# Patient Record
Sex: FEMALE | Race: WHITE | NOT HISPANIC OR LATINO | Employment: PART TIME | URBAN - METROPOLITAN AREA
[De-identification: names, ages, dates, MRNs, and addresses within clinical notes are randomized per-mention and may not be internally consistent; named-entity substitution may affect disease eponyms.]

---

## 2018-04-27 ENCOUNTER — OFFICE VISIT (OUTPATIENT)
Dept: INTERNAL MEDICINE | Facility: CLINIC | Age: 48
End: 2018-04-27

## 2018-04-27 ENCOUNTER — HOSPITAL ENCOUNTER (OUTPATIENT)
Dept: GENERAL RADIOLOGY | Facility: HOSPITAL | Age: 48
Discharge: HOME | End: 2018-04-27
Admitting: PHYSICIAN ASSISTANT

## 2018-04-27 VITALS
RESPIRATION RATE: 22 BRPM | SYSTOLIC BLOOD PRESSURE: 88 MMHG | HEART RATE: 100 BPM | DIASTOLIC BLOOD PRESSURE: 60 MMHG | TEMPERATURE: 97.6 F | WEIGHT: 124.2 LBS

## 2018-04-27 DIAGNOSIS — M51.36 DEGENERATIVE DISC DISEASE, LUMBAR: ICD-10-CM

## 2018-04-27 DIAGNOSIS — M54.6 THORACIC SPINE PAIN: ICD-10-CM

## 2018-04-27 DIAGNOSIS — M54.42 ACUTE LEFT-SIDED LOW BACK PAIN WITH LEFT-SIDED SCIATICA: ICD-10-CM

## 2018-04-27 DIAGNOSIS — M54.42 ACUTE LEFT-SIDED LOW BACK PAIN WITH LEFT-SIDED SCIATICA: Primary | ICD-10-CM

## 2018-04-27 DIAGNOSIS — R53.1 WEAKNESS: ICD-10-CM

## 2018-04-27 LAB
ANION GAP SERPL CALCULATED.3IONS-SCNC: 14 MMOL/L (ref 3–11)
BASOPHILS # BLD AUTO: 0.02 10*3/MM3 (ref 0–0.2)
BASOPHILS NFR BLD AUTO: 0.3 % (ref 0–1)
BUN BLD-MCNC: 19 MG/DL (ref 9–23)
BUN/CREAT SERPL: 23.8 (ref 7–25)
CALCIUM SPEC-SCNC: 9.4 MG/DL (ref 8.7–10.4)
CHLORIDE SERPL-SCNC: 104 MMOL/L (ref 99–109)
CO2 SERPL-SCNC: 20 MMOL/L (ref 20–31)
CREAT BLD-MCNC: 0.8 MG/DL (ref 0.6–1.3)
DEPRECATED RDW RBC AUTO: 58.9 FL (ref 37–54)
EOSINOPHIL # BLD AUTO: 0.18 10*3/MM3 (ref 0–0.3)
EOSINOPHIL NFR BLD AUTO: 2.5 % (ref 0–3)
ERYTHROCYTE [DISTWIDTH] IN BLOOD BY AUTOMATED COUNT: 18.3 % (ref 11.3–14.5)
GFR SERPL CREATININE-BSD FRML MDRD: 76 ML/MIN/1.73
GLUCOSE BLD-MCNC: 88 MG/DL (ref 70–100)
HCT VFR BLD AUTO: 37.6 % (ref 34.5–44)
HGB BLD-MCNC: 11.5 G/DL (ref 11.5–15.5)
IMM GRANULOCYTES # BLD: 0.02 10*3/MM3 (ref 0–0.03)
IMM GRANULOCYTES NFR BLD: 0.3 % (ref 0–0.6)
LYMPHOCYTES # BLD AUTO: 1.94 10*3/MM3 (ref 0.6–4.8)
LYMPHOCYTES NFR BLD AUTO: 27.4 % (ref 24–44)
MCH RBC QN AUTO: 27 PG (ref 27–31)
MCHC RBC AUTO-ENTMCNC: 30.6 G/DL (ref 32–36)
MCV RBC AUTO: 88.3 FL (ref 80–99)
MONOCYTES # BLD AUTO: 0.42 10*3/MM3 (ref 0–1)
MONOCYTES NFR BLD AUTO: 5.9 % (ref 0–12)
NEUTROPHILS # BLD AUTO: 4.5 10*3/MM3 (ref 1.5–8.3)
NEUTROPHILS NFR BLD AUTO: 63.6 % (ref 41–71)
PLATELET # BLD AUTO: 328 10*3/MM3 (ref 150–450)
PMV BLD AUTO: 10.5 FL (ref 6–12)
POTASSIUM BLD-SCNC: 4.4 MMOL/L (ref 3.5–5.5)
RBC # BLD AUTO: 4.26 10*6/MM3 (ref 3.89–5.14)
SODIUM BLD-SCNC: 138 MMOL/L (ref 132–146)
WBC NRBC COR # BLD: 7.08 10*3/MM3 (ref 3.5–10.8)

## 2018-04-27 PROCEDURE — 99204 OFFICE O/P NEW MOD 45 MIN: CPT | Performed by: PHYSICIAN ASSISTANT

## 2018-04-27 PROCEDURE — 72070 X-RAY EXAM THORAC SPINE 2VWS: CPT

## 2018-04-27 PROCEDURE — 72100 X-RAY EXAM L-S SPINE 2/3 VWS: CPT

## 2018-04-27 PROCEDURE — 85025 COMPLETE CBC W/AUTO DIFF WBC: CPT | Performed by: PHYSICIAN ASSISTANT

## 2018-04-27 PROCEDURE — 80048 BASIC METABOLIC PNL TOTAL CA: CPT | Performed by: PHYSICIAN ASSISTANT

## 2018-04-27 RX ORDER — GABAPENTIN 300 MG/1
300 CAPSULE ORAL 2 TIMES DAILY
Qty: 60 CAPSULE | Refills: 1 | Status: SHIPPED | OUTPATIENT
Start: 2018-04-27 | End: 2018-05-16

## 2018-04-27 RX ORDER — APIXABAN 5 MG/1
1 TABLET, FILM COATED ORAL 2 TIMES DAILY
Refills: 5 | COMMUNITY
Start: 2018-02-02 | End: 2018-09-28 | Stop reason: SDUPTHER
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
## 2018-04-30 DIAGNOSIS — M54.42 ACUTE LOW BACK PAIN WITH LEFT-SIDED SCIATICA, UNSPECIFIED BACK PAIN LATERALITY: Primary | ICD-10-CM

## 2018-04-30 DIAGNOSIS — M54.6 CHRONIC BILATERAL THORACIC BACK PAIN: ICD-10-CM

## 2018-04-30 DIAGNOSIS — G89.29 CHRONIC BILATERAL THORACIC BACK PAIN: ICD-10-CM

## 2018-05-01 ENCOUNTER — TELEPHONE (OUTPATIENT)
Dept: INTERNAL MEDICINE | Facility: CLINIC | Age: 48
End: 2018-05-01

## 2018-05-01 DIAGNOSIS — M62.830 MUSCLE SPASM OF BACK: Primary | ICD-10-CM

## 2018-05-01 RX ORDER — TIZANIDINE HYDROCHLORIDE 4 MG/1
4 CAPSULE, GELATIN COATED ORAL 3 TIMES DAILY
Qty: 90 CAPSULE | Refills: 0 | Status: SHIPPED | OUTPATIENT
Start: 2018-05-01 | End: 2018-08-16

## 2018-05-01 NOTE — TELEPHONE ENCOUNTER
Re: Zanaflex and physical therapy     That's fine, I will write for Zanaflex instead. She can d/c as necessary if it worsens her back spasms. Please advise that this may decrease her BP and BP is already low on regular basis, so may cause dizziness/ lightheadedness. Caution to rise slowly when going from sitting to standing until she knows how this med will affect her. Sent [facility] [place].     Regarding physical therapy referral, I am happy to put this in once she is cleared by neurosurgery. I am concerned that PT may worsen her symptoms at this time and would like to defer PT referral until she sees neurosurgery. I think this is a better long term plan so that she can discuss treatment options- surgical vs non-surgical options- with neurosurg first. Will order PT after neurosurgery appt as necessary.

## 2018-05-01 NOTE — TELEPHONE ENCOUNTER
Pt is willing to try Robaxin. She would rather have zanaflex if possible. [facility] [place]. She stated in the past it hasn't worked very well but the spasms are so bad she is willing to try again. She mentioned a referral to ortho as well? she mentioned that while at the hospital at [facility] they told her to have PCP put in a referral to Ortho and/or Physical Therapy. Can you advise regarding that?

## 2018-05-01 NOTE — TELEPHONE ENCOUNTER
Pt informed of Provider comment below. Read her word for word. Pt verbalized good understanding, stated she will call our office if she had any further issues, thanked our office and we ended the call.
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
## 2018-05-01 NOTE — TELEPHONE ENCOUNTER
Please call patient and let her know that we have put in referral for neurosurgery and they should be in touch soon, but may still take a few days for them to reach out. I can call in Robaxin as muscle relaxer for improved pain control and decrease muscle spasms, but please ask patient before I send this in as she has had poor reaction to other muscle relaxers (flexeril) in the past.  Continue Ibuprofen and Gabapentin as discussed during office visit.     ----- Message from Crisat Frye sent at 5/1/2018  9:56 AM EDT -----  Contact: SELF  OSITO BOWLING CALLING WITH PAIN IN HER BACK, SHE SAID SHE SPOKE WITH YOU YESTERDAY AND DISCUSSED PAIN MANAGEMENT AND NEUROLOGY BUT SAYS SHE HAS NOT HEARD ANYTHING AND THE NEURONTIN IS NOT HELPING AT ALL. SHE WANTS TO KNOW WHAT ELSE YOU WOULD SUGGEST UNTIL SHE CAN GET INTO PAIN MANAGEMENT. SHE CAN BE REACHED [phone]
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
## 2018-05-04 ENCOUNTER — TELEPHONE (OUTPATIENT)
Dept: INTERNAL MEDICINE | Facility: CLINIC | Age: 48
End: 2018-05-04

## 2018-05-04 NOTE — TELEPHONE ENCOUNTER
Please print work excuse for patient from 5/4/2018--5/11/2018. I will sign. Leave at front office for patient .    Please call patient and let her know we will provide work excuse from 5/4/2018-- 5/11/2018. She or Melanie can come pick this up today at the front office.           ----- Message from Sonia Landon V sent at 5/4/2018  9:24 AM EDT -----  PT STILL CAN'T WALK AND IS IN A LOT OF PAIN. ASKING IF WE CAN EXTEND HER EXCUSE FOR WORK    SHE CAN BE REACHED [phone]

## 2018-05-04 NOTE — TELEPHONE ENCOUNTER
Letter printed, awaiting signature. Called pt to inform, she verbalized understanding and stated her friend, Melanie Gallardo, will be the one to  the letter. I advised her that was fine to just make sure that Melanie had her photo ID to show the front staff. She verbalized good understanding, thanked our office and we ended the call.

## 2018-05-11 ENCOUNTER — TELEPHONE (OUTPATIENT)
Dept: INTERNAL MEDICINE | Facility: CLINIC | Age: 48
End: 2018-05-11

## 2018-05-11 ENCOUNTER — HOSPITAL ENCOUNTER (EMERGENCY)
Facility: HOSPITAL | Age: 48
Discharge: LEFT BEFORE DISCHARGE | End: 2018-05-11
Attending: EMERGENCY MEDICINE | Admitting: EMERGENCY MEDICINE

## 2018-05-11 VITALS
BODY MASS INDEX: 19.13 KG/M2 | HEIGHT: 66 IN | HEART RATE: 69 BPM | DIASTOLIC BLOOD PRESSURE: 74 MMHG | OXYGEN SATURATION: 100 % | TEMPERATURE: 97.8 F | RESPIRATION RATE: 16 BRPM | SYSTOLIC BLOOD PRESSURE: 97 MMHG | WEIGHT: 119 LBS

## 2018-05-11 DIAGNOSIS — R41.82 ALTERED MENTAL STATUS, UNSPECIFIED ALTERED MENTAL STATUS TYPE: ICD-10-CM

## 2018-05-11 DIAGNOSIS — R41.0 CONFUSION: ICD-10-CM

## 2018-05-11 DIAGNOSIS — N30.00 ACUTE CYSTITIS WITHOUT HEMATURIA: ICD-10-CM

## 2018-05-11 DIAGNOSIS — F19.10 POLYSUBSTANCE ABUSE (HCC): Primary | ICD-10-CM

## 2018-05-11 LAB
ALBUMIN SERPL-MCNC: 4.7 G/DL (ref 3.2–4.8)
ALBUMIN/GLOB SERPL: 1.5 G/DL (ref 1.5–2.5)
ALP SERPL-CCNC: 151 U/L (ref 25–100)
ALT SERPL W P-5'-P-CCNC: 43 U/L (ref 7–40)
AMPHET+METHAMPHET UR QL: POSITIVE
AMPHETAMINES UR QL: POSITIVE
ANION GAP SERPL CALCULATED.3IONS-SCNC: 13 MMOL/L (ref 3–11)
AST SERPL-CCNC: 26 U/L (ref 0–33)
BACTERIA UR QL AUTO: ABNORMAL /HPF
BARBITURATES UR QL SCN: NEGATIVE
BASOPHILS # BLD AUTO: 0.03 10*3/MM3 (ref 0–0.2)
BASOPHILS NFR BLD AUTO: 0.4 % (ref 0–1)
BENZODIAZ UR QL SCN: POSITIVE
BILIRUB SERPL-MCNC: 0.3 MG/DL (ref 0.3–1.2)
BILIRUB UR QL STRIP: NEGATIVE
BUN BLD-MCNC: 16 MG/DL (ref 9–23)
BUN/CREAT SERPL: 17.8 (ref 7–25)
BUPRENORPHINE SERPL-MCNC: POSITIVE NG/ML
CALCIUM SPEC-SCNC: 9.7 MG/DL (ref 8.7–10.4)
CANNABINOIDS SERPL QL: POSITIVE
CHLORIDE SERPL-SCNC: 105 MMOL/L (ref 99–109)
CLARITY UR: ABNORMAL
CO2 SERPL-SCNC: 25 MMOL/L (ref 20–31)
COCAINE UR QL: POSITIVE
COLOR UR: ABNORMAL
CREAT BLD-MCNC: 0.9 MG/DL (ref 0.6–1.3)
DEPRECATED RDW RBC AUTO: 58.9 FL (ref 37–54)
EOSINOPHIL # BLD AUTO: 0.05 10*3/MM3 (ref 0–0.3)
EOSINOPHIL NFR BLD AUTO: 0.6 % (ref 0–3)
ERYTHROCYTE [DISTWIDTH] IN BLOOD BY AUTOMATED COUNT: 18.4 % (ref 11.3–14.5)
GFR SERPL CREATININE-BSD FRML MDRD: 67 ML/MIN/1.73
GLOBULIN UR ELPH-MCNC: 3.2 GM/DL
GLUCOSE BLD-MCNC: 122 MG/DL (ref 70–100)
GLUCOSE UR STRIP-MCNC: NEGATIVE MG/DL
HCT VFR BLD AUTO: 33.6 % (ref 34.5–44)
HGB BLD-MCNC: 10.5 G/DL (ref 11.5–15.5)
HGB UR QL STRIP.AUTO: ABNORMAL
HOLD SPECIMEN: NORMAL
HOLD SPECIMEN: NORMAL
HYALINE CASTS UR QL AUTO: ABNORMAL /LPF
IMM GRANULOCYTES # BLD: 0.02 10*3/MM3 (ref 0–0.03)
IMM GRANULOCYTES NFR BLD: 0.2 % (ref 0–0.6)
KETONES UR QL STRIP: ABNORMAL
LEUKOCYTE ESTERASE UR QL STRIP.AUTO: NEGATIVE
LYMPHOCYTES # BLD AUTO: 2.31 10*3/MM3 (ref 0.6–4.8)
LYMPHOCYTES NFR BLD AUTO: 28.1 % (ref 24–44)
MCH RBC QN AUTO: 27.5 PG (ref 27–31)
MCHC RBC AUTO-ENTMCNC: 31.3 G/DL (ref 32–36)
MCV RBC AUTO: 88 FL (ref 80–99)
METHADONE UR QL SCN: NEGATIVE
MONOCYTES # BLD AUTO: 0.68 10*3/MM3 (ref 0–1)
MONOCYTES NFR BLD AUTO: 8.3 % (ref 0–12)
MUCOUS THREADS URNS QL MICRO: ABNORMAL /HPF
NEUTROPHILS # BLD AUTO: 5.14 10*3/MM3 (ref 1.5–8.3)
NEUTROPHILS NFR BLD AUTO: 62.6 % (ref 41–71)
NITRITE UR QL STRIP: NEGATIVE
OPIATES UR QL: NEGATIVE
OXYCODONE UR QL SCN: NEGATIVE
PCP UR QL SCN: NEGATIVE
PH UR STRIP.AUTO: <=5 [PH] (ref 5–8)
PLATELET # BLD AUTO: 390 10*3/MM3 (ref 150–450)
PMV BLD AUTO: 10.9 FL (ref 6–12)
POTASSIUM BLD-SCNC: 3.3 MMOL/L (ref 3.5–5.5)
PROPOXYPH UR QL: NEGATIVE
PROT SERPL-MCNC: 7.9 G/DL (ref 5.7–8.2)
PROT UR QL STRIP: ABNORMAL
RBC # BLD AUTO: 3.82 10*6/MM3 (ref 3.89–5.14)
RBC # UR: ABNORMAL /HPF
REF LAB TEST METHOD: ABNORMAL
SODIUM BLD-SCNC: 143 MMOL/L (ref 132–146)
SP GR UR STRIP: 1.03 (ref 1–1.03)
SQUAMOUS #/AREA URNS HPF: ABNORMAL /HPF
TRICYCLICS UR QL SCN: NEGATIVE
UROBILINOGEN UR QL STRIP: ABNORMAL
WBC NRBC COR # BLD: 8.21 10*3/MM3 (ref 3.5–10.8)
WBC UR QL AUTO: ABNORMAL /HPF
WHOLE BLOOD HOLD SPECIMEN: NORMAL
WHOLE BLOOD HOLD SPECIMEN: NORMAL

## 2018-05-11 PROCEDURE — 80306 DRUG TEST PRSMV INSTRMNT: CPT

## 2018-05-11 PROCEDURE — 81001 URINALYSIS AUTO W/SCOPE: CPT

## 2018-05-11 PROCEDURE — 80053 COMPREHEN METABOLIC PANEL: CPT | Performed by: EMERGENCY MEDICINE

## 2018-05-11 PROCEDURE — 99283 EMERGENCY DEPT VISIT LOW MDM: CPT

## 2018-05-11 PROCEDURE — 85025 COMPLETE CBC W/AUTO DIFF WBC: CPT | Performed by: EMERGENCY MEDICINE

## 2018-05-11 RX ORDER — SODIUM CHLORIDE 0.9 % (FLUSH) 0.9 %
10 SYRINGE (ML) INJECTION AS NEEDED
Status: DISCONTINUED | OUTPATIENT
Start: 2018-05-11 | End: 2018-05-12 | Stop reason: HOSPADM

## 2018-05-11 RX ORDER — NITROFURANTOIN 25; 75 MG/1; MG/1
100 CAPSULE ORAL 2 TIMES DAILY
Qty: 10 CAPSULE | Refills: 0 | Status: SHIPPED | OUTPATIENT
Start: 2018-05-11 | End: 2018-05-16

## 2018-05-11 NOTE — TELEPHONE ENCOUNTER
Noted.     ----- Message from Sonia Landon V sent at 5/11/2018  8:15 AM EDT -----  PTS PARTNER, HIRAM CIRA, CALLED STATING THAT PATIENT IS LOSING HER MEMORY. HER LONG TERM IS FINE BUT HER SHORT TERM IS COMPLETELY GONE.     SHE DOESN'T KNOW WHAT YEAR It, WHO THE PRESIDENT Is, SHE THINKS SHE IS STILL WORKING, FORGETTING WHERE SHE LIVES.    SPOKE WITH SHALINI AND SHE ADVISED PT GO TO Er.    PASSED MESSAGE ALONG AND PARTNER-HIRAM IS TAKING HER TO [facility] ER    SHE CAN BE REACHED [phone]

## 2018-05-12 NOTE — DISCHARGE INSTRUCTIONS
Follow up with Dr. Man at the next available time. Call and make an appointment.    Keep your scheduled appointment with Dr. Davenport.    Follow up with Dr. Benavides at the next available time.    Immediately return to the emergency department for any new or worsening symptoms.     Please review the medications you are supposed to be taking according to prior physician recommendations. I have not changed your home medications during this visit. If your discharge instructions indicate that I have changed your home medications, this is not the case, and you should disregard. If you have any questions about the medication you should be taking at home, please call your physician.
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
## 2018-05-16 ENCOUNTER — OFFICE VISIT (OUTPATIENT)
Dept: NEUROSURGERY | Facility: CLINIC | Age: 48
End: 2018-05-16

## 2018-05-16 VITALS
DIASTOLIC BLOOD PRESSURE: 64 MMHG | TEMPERATURE: 98.1 F | HEIGHT: 67 IN | WEIGHT: 126 LBS | SYSTOLIC BLOOD PRESSURE: 112 MMHG | BODY MASS INDEX: 19.78 KG/M2

## 2018-05-16 DIAGNOSIS — F19.10 POLYSUBSTANCE ABUSE (HCC): ICD-10-CM

## 2018-05-16 DIAGNOSIS — Z71.6 ENCOUNTER FOR SMOKING CESSATION COUNSELING: ICD-10-CM

## 2018-05-16 DIAGNOSIS — M54.42 ACUTE LEFT-SIDED LOW BACK PAIN WITH LEFT-SIDED SCIATICA: ICD-10-CM

## 2018-05-16 DIAGNOSIS — M54.9 MECHANICAL BACK PAIN: ICD-10-CM

## 2018-05-16 DIAGNOSIS — M51.36 DISC DEGENERATION, LUMBAR: Primary | ICD-10-CM

## 2018-05-16 PROCEDURE — 99213 OFFICE O/P EST LOW 20 MIN: CPT | Performed by: PHYSICIAN ASSISTANT

## 2018-05-16 PROCEDURE — 99406 BEHAV CHNG SMOKING 3-10 MIN: CPT | Performed by: PHYSICIAN ASSISTANT

## 2018-05-16 RX ORDER — ARIPIPRAZOLE 10 MG/1
TABLET ORAL
COMMUNITY
Start: 2018-04-30 | End: 2018-05-30

## 2018-05-16 RX ORDER — CLINDAMYCIN HYDROCHLORIDE 300 MG/1
CAPSULE ORAL
COMMUNITY
Start: 2018-03-23 | End: 2018-08-16

## 2018-05-16 RX ORDER — LORATADINE, PSEUDOEPHEDRINE SULFATE 5; 120 MG/1; MG/1
TABLET, FILM COATED, EXTENDED RELEASE ORAL
COMMUNITY
Start: 2018-03-13 | End: 2018-08-16

## 2018-05-16 RX ORDER — ACYCLOVIR 50 MG/G
OINTMENT TOPICAL
COMMUNITY
Start: 2018-03-23 | End: 2018-05-30

## 2018-05-16 RX ORDER — BENZONATATE 200 MG/1
CAPSULE ORAL
COMMUNITY
Start: 2018-03-13 | End: 2018-08-16

## 2018-05-16 RX ORDER — TIZANIDINE 4 MG/1
TABLET ORAL
COMMUNITY
Start: 2018-05-01 | End: 2018-08-16
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
## 2018-05-23 PROBLEM — F19.10 POLYSUBSTANCE ABUSE (HCC): Status: ACTIVE | Noted: 2018-05-23

## 2018-05-30 ENCOUNTER — OFFICE VISIT (OUTPATIENT)
Dept: NEUROLOGY | Facility: CLINIC | Age: 48
End: 2018-05-30

## 2018-05-30 ENCOUNTER — APPOINTMENT (OUTPATIENT)
Dept: LAB | Facility: HOSPITAL | Age: 48
End: 2018-05-30

## 2018-05-30 VITALS
WEIGHT: 128 LBS | HEART RATE: 68 BPM | HEIGHT: 67 IN | OXYGEN SATURATION: 98 % | BODY MASS INDEX: 20.09 KG/M2 | DIASTOLIC BLOOD PRESSURE: 50 MMHG | SYSTOLIC BLOOD PRESSURE: 84 MMHG

## 2018-05-30 DIAGNOSIS — F19.10 POLYSUBSTANCE ABUSE (HCC): ICD-10-CM

## 2018-05-30 DIAGNOSIS — R41.3 MEMORY LOSS: Primary | ICD-10-CM

## 2018-05-30 LAB
25(OH)D3 SERPL-MCNC: 24.8 NG/ML
ALBUMIN SERPL-MCNC: 4.7 G/DL (ref 3.2–4.8)
ALBUMIN/GLOB SERPL: 1.6 G/DL (ref 1.5–2.5)
ALP SERPL-CCNC: 115 U/L (ref 25–100)
ALT SERPL W P-5'-P-CCNC: 48 U/L (ref 7–40)
AMPHET+METHAMPHET UR QL: NEGATIVE
AMPHETAMINES UR QL: NEGATIVE
ANION GAP SERPL CALCULATED.3IONS-SCNC: 8 MMOL/L (ref 3–11)
AST SERPL-CCNC: 42 U/L (ref 0–33)
BARBITURATES UR QL SCN: NEGATIVE
BENZODIAZ UR QL SCN: POSITIVE
BILIRUB SERPL-MCNC: 0.2 MG/DL (ref 0.3–1.2)
BUN BLD-MCNC: 10 MG/DL (ref 9–23)
BUN/CREAT SERPL: 14.3 (ref 7–25)
BUPRENORPHINE SERPL-MCNC: NEGATIVE NG/ML
CALCIUM SPEC-SCNC: 9.7 MG/DL (ref 8.7–10.4)
CANNABINOIDS SERPL QL: NEGATIVE
CHLORIDE SERPL-SCNC: 105 MMOL/L (ref 99–109)
CO2 SERPL-SCNC: 28 MMOL/L (ref 20–31)
COCAINE UR QL: NEGATIVE
CREAT BLD-MCNC: 0.7 MG/DL (ref 0.6–1.3)
DEPRECATED FTI SERPL-MCNC: 2.7 TBI
ERYTHROCYTE [SEDIMENTATION RATE] IN BLOOD: 36 MM/HR (ref 0–20)
FOLATE SERPL-MCNC: 1.57 NG/ML (ref 3.2–20)
GFR SERPL CREATININE-BSD FRML MDRD: 89 ML/MIN/1.73
GLOBULIN UR ELPH-MCNC: 3 GM/DL
GLUCOSE BLD-MCNC: 92 MG/DL (ref 70–100)
HAV IGM SERPL QL IA: ABNORMAL
HBV CORE IGM SERPL QL IA: ABNORMAL
HBV SURFACE AG SERPL QL IA: ABNORMAL
HCV AB SER DONR QL: REACTIVE
HIV1+2 AB SER QL: NORMAL
METHADONE UR QL SCN: NEGATIVE
OPIATES UR QL: NEGATIVE
OXYCODONE UR QL SCN: POSITIVE
PCP UR QL SCN: NEGATIVE
POTASSIUM BLD-SCNC: 4.6 MMOL/L (ref 3.5–5.5)
PROPOXYPH UR QL: NEGATIVE
PROT SERPL-MCNC: 7.7 G/DL (ref 5.7–8.2)
SODIUM BLD-SCNC: 141 MMOL/L (ref 132–146)
T3RU NFR SERPL: 31.4 % (ref 23–37)
T4 SERPL-MCNC: 8.6 MCG/DL (ref 4.7–11.4)
TRICYCLICS UR QL SCN: NEGATIVE
TSH SERPL DL<=0.05 MIU/L-ACNC: 1.97 MIU/ML (ref 0.35–5.35)
VIT B12 BLD-MCNC: 285 PG/ML (ref 211–911)

## 2018-05-30 PROCEDURE — 82746 ASSAY OF FOLIC ACID SERUM: CPT | Performed by: NURSE PRACTITIONER

## 2018-05-30 PROCEDURE — 84436 ASSAY OF TOTAL THYROXINE: CPT | Performed by: NURSE PRACTITIONER

## 2018-05-30 PROCEDURE — 36415 COLL VENOUS BLD VENIPUNCTURE: CPT | Performed by: NURSE PRACTITIONER

## 2018-05-30 PROCEDURE — 99204 OFFICE O/P NEW MOD 45 MIN: CPT | Performed by: NURSE PRACTITIONER

## 2018-05-30 PROCEDURE — 87389 HIV-1 AG W/HIV-1&-2 AB AG IA: CPT | Performed by: NURSE PRACTITIONER

## 2018-05-30 PROCEDURE — 80053 COMPREHEN METABOLIC PANEL: CPT | Performed by: NURSE PRACTITIONER

## 2018-05-30 PROCEDURE — 84443 ASSAY THYROID STIM HORMONE: CPT | Performed by: NURSE PRACTITIONER

## 2018-05-30 PROCEDURE — G0432 EIA HIV-1/HIV-2 SCREEN: HCPCS | Performed by: NURSE PRACTITIONER

## 2018-05-30 PROCEDURE — 84479 ASSAY OF THYROID (T3 OR T4): CPT | Performed by: NURSE PRACTITIONER

## 2018-05-30 PROCEDURE — 82607 VITAMIN B-12: CPT | Performed by: NURSE PRACTITIONER

## 2018-05-30 PROCEDURE — 87522 HEPATITIS C REVRS TRNSCRPJ: CPT | Performed by: NURSE PRACTITIONER

## 2018-05-30 PROCEDURE — 80306 DRUG TEST PRSMV INSTRMNT: CPT | Performed by: NURSE PRACTITIONER

## 2018-05-30 PROCEDURE — 82306 VITAMIN D 25 HYDROXY: CPT | Performed by: NURSE PRACTITIONER

## 2018-05-30 PROCEDURE — 86592 SYPHILIS TEST NON-TREP QUAL: CPT | Performed by: NURSE PRACTITIONER

## 2018-05-30 PROCEDURE — 80074 ACUTE HEPATITIS PANEL: CPT | Performed by: NURSE PRACTITIONER

## 2018-05-30 RX ORDER — ACETAMINOPHEN 500 MG
500 TABLET ORAL EVERY 6 HOURS PRN
Status: ON HOLD | COMMUNITY
End: 2019-05-06

## 2018-05-30 RX ORDER — CLONAZEPAM 1 MG/1
1 TABLET ORAL 2 TIMES DAILY PRN
COMMUNITY
End: 2018-05-30
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
## 2018-06-01 ENCOUNTER — TELEPHONE (OUTPATIENT)
Dept: NEUROLOGY | Facility: CLINIC | Age: 48
End: 2018-06-01

## 2018-06-01 DIAGNOSIS — B19.20 HEPATITIS C VIRUS INFECTION WITHOUT HEPATIC COMA, UNSPECIFIED CHRONICITY: Primary | ICD-10-CM

## 2018-06-01 LAB — RPR SER QL: NORMAL

## 2018-06-01 NOTE — TELEPHONE ENCOUNTER
----- Message from JOHN Patton sent at 6/1/2018 10:58 AM EDT -----  Please let her know her test for syphilis and HIV were negative however she did test positive for hepatitis C.  I'm going to put in a referral for infectious disease.  Her liver enzymes are slightly elevated, vitamin D level is also slightly decreased, I recommend that she take an over-the-counter vitamin D supplement as directed on the box, B12 level stable, thyroid studies are unremarkable.  She does have a decreased folic acid level, I recommend that she take folic acid supplement over-the-counter as well.  Her urine drug screen was positive for benzodiazepines as well as oxycodone which is Percocet, again I recommend that she refrain from any drug use

## 2018-06-02 LAB
HCV RNA SERPL NAA+PROBE-ACNC: NORMAL IU/ML
TEST INFORMATION: NORMAL

## 2018-06-04 ENCOUNTER — TELEPHONE (OUTPATIENT)
Dept: NEUROLOGY | Facility: CLINIC | Age: 48
End: 2018-06-04

## 2018-06-05 NOTE — TELEPHONE ENCOUNTER
Pt is informed.  She would like to see if Dr. Aguirre ca see her for the  positive Hep C, because she has seen him before.  She would also like for us to call out VIT D and Folic Acid because she isn't working at the time and her insurance will pay for it.

## 2018-06-15 ENCOUNTER — HOSPITAL ENCOUNTER (OUTPATIENT)
Dept: NEUROLOGY | Facility: HOSPITAL | Age: 48
Discharge: HOME | End: 2018-06-15
Attending: NURSE PRACTITIONER | Admitting: NURSE PRACTITIONER

## 2018-06-15 ENCOUNTER — HOSPITAL ENCOUNTER (OUTPATIENT)
Dept: MRI IMAGING | Facility: HOSPITAL | Age: 48
Discharge: HOME | End: 2018-06-15
Attending: NURSE PRACTITIONER

## 2018-06-15 PROCEDURE — 95816 EEG AWAKE AND DROWSY: CPT

## 2018-06-15 PROCEDURE — 70551 MRI BRAIN STEM W/O DYE: CPT

## 2018-06-26 ENCOUNTER — OFFICE VISIT (OUTPATIENT)
Dept: NEUROLOGY | Facility: CLINIC | Age: 48
End: 2018-06-26

## 2018-06-26 VITALS
BODY MASS INDEX: 21.35 KG/M2 | DIASTOLIC BLOOD PRESSURE: 50 MMHG | OXYGEN SATURATION: 98 % | SYSTOLIC BLOOD PRESSURE: 86 MMHG | HEIGHT: 67 IN | HEART RATE: 65 BPM | WEIGHT: 136 LBS

## 2018-06-26 DIAGNOSIS — E53.8 FOLATE DEFICIENCY: ICD-10-CM

## 2018-06-26 DIAGNOSIS — R41.3 MEMORY LOSS: Primary | ICD-10-CM

## 2018-06-26 PROCEDURE — 99214 OFFICE O/P EST MOD 30 MIN: CPT | Performed by: NURSE PRACTITIONER

## 2018-06-26 RX ORDER — ARIPIPRAZOLE 10 MG/1
10 TABLET ORAL DAILY
COMMUNITY
End: 2018-09-28

## 2018-06-26 RX ORDER — CLONAZEPAM 1 MG/1
1 TABLET ORAL 2 TIMES DAILY PRN
COMMUNITY
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
## 2018-06-27 RX ORDER — LANOLIN ALCOHOL/MO/W.PET/CERES
400 CREAM (GRAM) TOPICAL DAILY
Qty: 30 TABLET | Refills: 11 | Status: SHIPPED | OUTPATIENT
Start: 2018-06-27 | End: 2020-04-21 | Stop reason: ALTCHOICE

## 2018-06-27 RX ORDER — DONEPEZIL HYDROCHLORIDE 5 MG/1
5 TABLET, FILM COATED ORAL NIGHTLY
Qty: 30 TABLET | Refills: 2 | Status: SHIPPED | OUTPATIENT
Start: 2018-06-27 | End: 2018-08-13 | Stop reason: SDUPTHER

## 2018-06-28 ENCOUNTER — OFFICE VISIT (OUTPATIENT)
Dept: NEUROSURGERY | Facility: CLINIC | Age: 48
End: 2018-06-28

## 2018-06-28 ENCOUNTER — TELEPHONE (OUTPATIENT)
Dept: NEUROLOGY | Facility: CLINIC | Age: 48
End: 2018-06-28

## 2018-06-28 VITALS
HEART RATE: 72 BPM | HEIGHT: 67 IN | DIASTOLIC BLOOD PRESSURE: 52 MMHG | TEMPERATURE: 98 F | WEIGHT: 136 LBS | BODY MASS INDEX: 21.35 KG/M2 | SYSTOLIC BLOOD PRESSURE: 82 MMHG

## 2018-06-28 DIAGNOSIS — M54.42 ACUTE LEFT-SIDED LOW BACK PAIN WITH LEFT-SIDED SCIATICA: ICD-10-CM

## 2018-06-28 DIAGNOSIS — Z71.6 ENCOUNTER FOR SMOKING CESSATION COUNSELING: ICD-10-CM

## 2018-06-28 DIAGNOSIS — M54.9 MECHANICAL BACK PAIN: ICD-10-CM

## 2018-06-28 DIAGNOSIS — F19.10 POLYSUBSTANCE ABUSE (HCC): ICD-10-CM

## 2018-06-28 DIAGNOSIS — M51.36 DISC DEGENERATION, LUMBAR: Primary | ICD-10-CM

## 2018-06-28 PROCEDURE — 99213 OFFICE O/P EST LOW 20 MIN: CPT | Performed by: PHYSICIAN ASSISTANT

## 2018-06-28 PROCEDURE — 99406 BEHAV CHNG SMOKING 3-10 MIN: CPT | Performed by: PHYSICIAN ASSISTANT

## 2018-06-28 NOTE — PROGRESS NOTES
Patient: Cecille Quinn  : 1970  Chart #: [id number]    Date of Service: 2018    CHIEF COMPLAINT: Back pain     History of Present Illness Patient is seen in follow up. She is a 48 year old  who has a history of chronic low back pain.  A few months ago she was at work and bent over to lift something and felt a pop in her back.  She experienced pain in the low back into the sacral area since that time.  Sometimes the pain radiates up into her neck.  Pain is worse with walking, sitting in chairs, bending, and any kind of physical exertion.  Symptoms are better when lying very still.  Rarely she has some left-sided sciatic pain which was reported by her partner.  Patient's partner is also concerned about some memory loss as of late and feels like the patient does not remember all the symptoms she has been experiencing over the last few months.  Her history is significant for polysubstance abuse.  She uses non-prescribed opioids for pain.   When she was last seen I referred her for formal physcial therapy which she was unable to do, but would like a new order.      Overall she has been doing a little better. She is no longer walking with crutches for assistance.  She continues to deny weakness or bowel or bladder difficulties.      I reviewed the following portions of the patient's history and updated as appropriate: allergies, current medications, past family history, past medical history, past social history, past surgical history and problem list. I discussed >3m the need to STOP smoking, there is a direct correlation between smoking and wound healing and degenerative disc disease. Patient will take this under advisement and discuss with their primary provider.        Review of Systems   Constitutional: Positive for activity change and fatigue.   Endocrine: Positive for cold intolerance.   Genitourinary: Positive for pelvic pain.   Musculoskeletal: Positive for back pain, gait problem, myalgias  "and neck stiffness.   Neurological: Positive for weakness and numbness.   Psychiatric/Behavioral: Positive for agitation, confusion, decreased concentration, dysphoric mood and sleep disturbance. The patient is nervous/anxious.    All other systems reviewed and are negative.      Objective   Vital Signs: Blood pressure (!) 82/52, pulse 72, temperature 98 °F (36.7 °C), height 170.2 cm (67\"), weight 61.7 kg (136 lb).  Physical Exam  Constitutional: She appears well-developed and well-nourished. No distress.   HENT:   Head: Normocephalic and atraumatic.   Eyes: EOM are normal. Pupils are equal, round, and reactive to light.   Cardiovascular: Normal rate and regular rhythm.    Pulmonary/Chest: Effort normal and breath sounds normal.   Psychiatric: She has a normal mood and affect. Her behavior is normal. Thought content normal.   Nursing note and vitals reviewed.  Musculoskeletal:  Strength is intact in upper and lower extremities to direct testing.  Patient ambulates in a very slow but upright fashion without weakness or spasticity.   Straight leg raising is negative. Internal rotation of the hip on the left elicits some pain in the sacrum  Neurologic:  Muscle tone is normal throughout.  Coordination is intact.  Deep tendon reflexes: 2+ and symmetrical.  Sensation is intact to light touch throughout.  Patient is oriented to person, place, and time.  Price sign negative.     Independent review of radiographic imaging: plain films of the lumbar spine demonstrate disc space narrowing at l5-S1. Vertebral bodies are in good alignment.     Assessment/Plan    Diagnosis: Mechanical back pain     Medical Decision Making: Once again I referred patient to formal physical therapy. She had some logistical issues getting this set up last time.  Pain should be managed with therapy and OTC analgesics. I will see her in follow up after her therapy.             Cecille was seen today for follow-up.    Diagnoses and all orders for this " visit:    Disc degeneration, lumbar  -     Ambulatory Referral to Physical Therapy Evaluate and treat    Mechanical back pain  -     Ambulatory Referral to Physical Therapy Evaluate and treat    Acute left-sided low back pain with left-sided sciatica  -     Ambulatory Referral to Physical Therapy Evaluate and treat    Encounter for smoking cessation counseling  -     Ambulatory Referral to Physical Therapy Evaluate and treat               Juany Watkins PA-C  Patient Care Team:  Merry Ortega MD as PCP - General (Internal Medicine)  Payton Benavides PA-C as Referring Physician (Physician Assistant)

## 2018-07-09 ENCOUNTER — HOSPITAL ENCOUNTER (OUTPATIENT)
Dept: SPEECH THERAPY | Facility: HOSPITAL | Age: 48
Setting detail: THERAPIES SERIES
Discharge: HOME | End: 2018-07-09

## 2018-07-09 ENCOUNTER — HOSPITAL ENCOUNTER (OUTPATIENT)
Dept: PHYSICAL THERAPY | Facility: HOSPITAL | Age: 48
Setting detail: THERAPIES SERIES
Discharge: HOME | End: 2018-07-09

## 2018-07-09 DIAGNOSIS — R41.841 COGNITIVE COMMUNICATION DEFICIT: Primary | ICD-10-CM

## 2018-07-09 DIAGNOSIS — M54.42 ACUTE LEFT-SIDED LOW BACK PAIN WITH LEFT-SIDED SCIATICA: Primary | ICD-10-CM

## 2018-07-09 PROCEDURE — 97110 THERAPEUTIC EXERCISES: CPT | Performed by: PHYSICAL THERAPIST

## 2018-07-09 PROCEDURE — G9169 MEMORY GOAL STATUS: HCPCS

## 2018-07-09 PROCEDURE — 92523 SPEECH SOUND LANG COMPREHEN: CPT

## 2018-07-09 PROCEDURE — 97161 PT EVAL LOW COMPLEX 20 MIN: CPT | Performed by: PHYSICAL THERAPIST

## 2018-07-09 PROCEDURE — G9168 MEMORY CURRENT STATUS: HCPCS
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
## 2018-07-23 ENCOUNTER — HOSPITAL ENCOUNTER (OUTPATIENT)
Dept: SPEECH THERAPY | Facility: HOSPITAL | Age: 48
Setting detail: THERAPIES SERIES
Discharge: HOME | End: 2018-07-23

## 2018-07-23 ENCOUNTER — HOSPITAL ENCOUNTER (OUTPATIENT)
Dept: PHYSICAL THERAPY | Facility: HOSPITAL | Age: 48
Setting detail: THERAPIES SERIES
Discharge: HOME | End: 2018-07-23

## 2018-07-23 DIAGNOSIS — M54.42 ACUTE LEFT-SIDED LOW BACK PAIN WITH LEFT-SIDED SCIATICA: Primary | ICD-10-CM

## 2018-07-23 DIAGNOSIS — R41.841 COGNITIVE COMMUNICATION DEFICIT: Primary | ICD-10-CM

## 2018-07-23 PROCEDURE — G0515 COGNITIVE SKILLS DEVELOPMENT: HCPCS

## 2018-07-23 PROCEDURE — 97110 THERAPEUTIC EXERCISES: CPT | Performed by: PHYSICAL THERAPIST

## 2018-07-23 NOTE — THERAPY TREATMENT NOTE
Outpatient Speech Language Pathology   Adult Speech Language Cognitive Treatment Note  [facility]     Patient Name: Cecille Quinn  : 1970  MRN: [id number]  Today's Date: 2018         Visit Date: 2018   Patient Active Problem List   Diagnosis   • Left-sided low back pain with left-sided sciatica   • Polysubstance abuse          Visit Dx:    ICD-10-CM ICD-9-CM   1. Cognitive communication deficit R41.841 799.52             SLP SLC Evaluation - 18 0800        Cognitive Assessment Intervention- SLP    Thought Organization (Cognitive) abstract divergent;abstract convergent;WFL  -AM    Reasoning (Cognitive) mod-complex;deductive;moderate impairment;severe impairment   33% without cues  -AM    Problem Solving (Cognitive) multifactorial;temporal;WFL  -AM    Functional Math (Cognitive) complex;word problems;WFL  -AM      User Key  (r) = Recorded By, (t) = Taken By, (c) = Cosigned By    Initials Name Provider Type    AM Paula Garcia CCC-SLP Speech and Language Pathologist                              SLP OP Goals     Row Name 18 0800          Subjective Comments    Subjective Comments Pt. reports that  -AM        Subjective Pain    Able to rate subjective pain? yes  -AM     Pre-Treatment Pain Level 0  -AM     Post-Treatment Pain Level 0  -AM        Other Goals    Other Adult Goal- 1 LTG 1: Pt. will demonstrate improvement in cognitive communication with intermittent cues.   -AM     Status: Other Adult Goal- 1 New  -AM     Other Adult Goal- 2 STG 1: Pt. will immediately recall 5 unrelated words with 80% accuracy and intermittent cues.  -AM     Status: Other Adult Goal- 2 New  -AM     Other Adult Goal- 3 STG 2: Pt. will recall 3 unrelated words post 5 min with 80% accuracy and intermittent cues  -AM     Status: Other Adult Goal- 3 New  -AM     Other Adult Goal- 4 STG 3: Pt. will recall details from 50-65 wd. paragraphs with 80% accuracy and intermittent cues.   -AM     Status: Other Adult  Goal- 4 New  -AM     Other Adult Goal- 5 STG 4: Pt. will complete ongoing evaluation for organization, reasoning, and executive functioning.   -AM     Status: Other Adult Goal- 5 Achieved  -AM     Comments: Other Adult Goal- 5 7/23: Completed and new STGs added PRN.  -AM     Other Adult Goal- 6 STG 5: Pt. will complete mod-complex deductive reasoning puzzles with 90% accuracy and intermittent cues.   -AM     Status: Other Adult Goal- 6 New  -AM     Comments: Other Adult Goal- 6 7/23: New STG added.  -AM        SLP Time Calculation    SLP Goal Re-Cert Due Date 10/07/18  -AM       User Key  (r) = Recorded By, (t) = Taken By, (c) = Cosigned By    Initials Name Provider Type    AM BILLIE LamarSLP Speech and Language Pathologist                OP SLP Education     Row Name 07/23/18 0800       Education    Barriers to Learning No barriers identified  -AM    Education Provided Patient demonstrated recommended strategies;Patient requires further education on strategies, risks  -AM    Assessed Learning motivation  -AM    Learning Motivation Strong  -AM    Learning Method Explanation;Demonstration  -AM    Teaching Response Verbalized understanding;Demonstrated understanding  -AM      User Key  (r) = Recorded By, (t) = Taken By, (c) = Cosigned By    Initials Name Effective Dates    AM BECCA Lamar 04/03/18 -                 OP SLP Assessment/Plan - 07/23/18 0800        SLP Plan    Plan Comments Continue with POC  -AM      User Key  (r) = Recorded By, (t) = Taken By, (c) = Cosigned By    Initials Name Provider Type    AM BECCA Lamar Speech and Language Pathologist                 Time Calculation:   SLP Start Time: 0800    Therapy Charges for Today     Code Description Service Date Service Provider Modifiers Qty    29198428636  ST DEV OF COGN SKILLS EACH 15 MIN 7/23/2018 BECCA Lamar  4                   BECCA Lamar  7/23/2018
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
## 2018-07-30 ENCOUNTER — HOSPITAL ENCOUNTER (OUTPATIENT)
Dept: SPEECH THERAPY | Facility: HOSPITAL | Age: 48
Setting detail: THERAPIES SERIES
Discharge: HOME | End: 2018-07-30

## 2018-07-30 DIAGNOSIS — R41.841 COGNITIVE COMMUNICATION DEFICIT: Primary | ICD-10-CM

## 2018-07-30 PROCEDURE — G0515 COGNITIVE SKILLS DEVELOPMENT: HCPCS

## 2018-07-30 NOTE — THERAPY TREATMENT NOTE
Outpatient Speech Language Pathology   Adult Speech Language Cognitive Treatment Note  [facility]     Patient Name: Cecille Quinn  : 1970  MRN: [id number]  Today's Date: 2018         Visit Date: 2018   Patient Active Problem List   Diagnosis   • Left-sided low back pain with left-sided sciatica   • Polysubstance abuse          Visit Dx:    ICD-10-CM ICD-9-CM   1. Cognitive communication deficit R41.841 799.52                               SLP OP Goals     Row Name 18 0800          Subjective Comments    Subjective Comments Pt. reports that she is in some pain. Otherwise she is tired.   -AM        Subjective Pain    Able to rate subjective pain? yes  -AM     Pre-Treatment Pain Level 4  -AM     Post-Treatment Pain Level 4  -AM        Other Goals    Other Adult Goal- 1 LTG 1: Pt. will demonstrate improvement in cognitive communication with intermittent cues.   -AM     Status: Other Adult Goal- 1 Progressing as expected  -AM     Comments: Other Adult Goal- 1 : Initiated STGs. Performing well with min-mod cues.   -AM     Other Adult Goal- 2 STG 1: Pt. will immediately recall 5 unrelated words with 80% accuracy and intermittent cues.  -AM     Status: Other Adult Goal- 2 Progressing as expected  -AM     Comments: Other Adult Goal- 2 : Immediately recall 5 unrelated words: 100% with one repetition each.   -AM     Other Adult Goal- 3 STG 2: Pt. will recall 3 unrelated words post 5 min with 80% accuracy and intermittent cues  -AM     Status: Other Adult Goal- 3 Progressing as expected  -AM     Comments: Other Adult Goal- 3 : Recall 3 unrelated words post 5 min: 67% with intermittent cues  -AM     Other Adult Goal- 4 STG 3: Pt. will recall details from 50-65 wd. paragraphs with 80% accuracy and intermittent cues.   -AM     Status: Other Adult Goal- 4 Progressing as expected  -AM     Comments: Other Adult Goal- 4 : Immediate recall of info from 1 min news: 70% with intermittent cues.    -AM     Other Adult Goal- 5 STG 4: Pt. will complete ongoing evaluation for organization, reasoning, and executive functioning.   -AM     Status: Other Adult Goal- 5 Achieved  -AM     Comments: Other Adult Goal- 5 7/23: Completed and new STGs added PRN.  -AM     Other Adult Goal- 6 STG 5: Pt. will complete mod-complex deductive reasoning puzzles with 90% accuracy and intermittent cues.   -AM     Status: Other Adult Goal- 6 Progressing as expected  -AM     Comments: Other Adult Goal- 6 7/30: Deductive puzzle (2x5 grid): 80% with intermittent cues.   -AM        SLP Time Calculation    SLP Goal Re-Cert Due Date 10/07/18  -AM       User Key  (r) = Recorded By, (t) = Taken By, (c) = Cosigned By    Initials Name Provider Type    AM BILLIE LmaarSLP Speech and Language Pathologist                OP SLP Education     Row Name 07/30/18 0800       Education    Barriers to Learning No barriers identified  -AM    Education Provided Patient demonstrated recommended strategies;Patient requires further education on strategies, risks  -AM    Assessed Learning motivation  -AM    Learning Motivation Strong  -AM    Learning Method Explanation;Demonstration  -AM    Teaching Response Verbalized understanding;Demonstrated understanding  -AM      User Key  (r) = Recorded By, (t) = Taken By, (c) = Cosigned By    Initials Name Effective Dates    AM BECCA Lamar 04/03/18 -                 OP SLP Assessment/Plan - 07/30/18 0800        SLP Plan    Plan Comments Continue with POC  -AM      User Key  (r) = Recorded By, (t) = Taken By, (c) = Cosigned By    Initials Name Provider Type    AM Paula Garcia CCC-SLP Speech and Language Pathologist                 Time Calculation:   SLP Start Time: 0800    Therapy Charges for Today     Code Description Service Date Service Provider Modifiers Qty    78426525357  ST DEV OF COGN SKILLS EACH 15 MIN 7/30/2018 BILLIE LamarSLP  4                   Paula Garcia,  CCC-SLP  7/30/2018

## 2018-08-13 ENCOUNTER — OFFICE VISIT (OUTPATIENT)
Dept: NEUROLOGY | Facility: CLINIC | Age: 48
End: 2018-08-13

## 2018-08-13 VITALS
HEART RATE: 57 BPM | OXYGEN SATURATION: 98 % | DIASTOLIC BLOOD PRESSURE: 58 MMHG | SYSTOLIC BLOOD PRESSURE: 98 MMHG | BODY MASS INDEX: 21.82 KG/M2 | HEIGHT: 67 IN | WEIGHT: 139 LBS

## 2018-08-13 DIAGNOSIS — R41.3 MEMORY LOSS: Primary | ICD-10-CM

## 2018-08-13 PROCEDURE — 99213 OFFICE O/P EST LOW 20 MIN: CPT | Performed by: NURSE PRACTITIONER

## 2018-08-13 RX ORDER — DONEPEZIL HYDROCHLORIDE 10 MG/1
TABLET, FILM COATED ORAL
Qty: 30 TABLET | Refills: 5 | Status: SHIPPED | OUTPATIENT
Start: 2018-08-13 | End: 2019-09-25
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
## 2018-08-14 ENCOUNTER — HOSPITAL ENCOUNTER (OUTPATIENT)
Dept: SPEECH THERAPY | Facility: HOSPITAL | Age: 48
Setting detail: THERAPIES SERIES
Discharge: HOME | End: 2018-08-14

## 2018-08-14 ENCOUNTER — HOSPITAL ENCOUNTER (OUTPATIENT)
Dept: PHYSICAL THERAPY | Facility: HOSPITAL | Age: 48
Setting detail: THERAPIES SERIES
Discharge: HOME | End: 2018-08-14

## 2018-08-14 DIAGNOSIS — M54.42 ACUTE LEFT-SIDED LOW BACK PAIN WITH LEFT-SIDED SCIATICA: Primary | ICD-10-CM

## 2018-08-14 DIAGNOSIS — R41.841 COGNITIVE COMMUNICATION DEFICIT: Primary | ICD-10-CM

## 2018-08-14 PROCEDURE — G0515 COGNITIVE SKILLS DEVELOPMENT: HCPCS | Performed by: SPEECH-LANGUAGE PATHOLOGIST

## 2018-08-14 PROCEDURE — G9168 MEMORY CURRENT STATUS: HCPCS | Performed by: SPEECH-LANGUAGE PATHOLOGIST

## 2018-08-14 PROCEDURE — 97110 THERAPEUTIC EXERCISES: CPT | Performed by: PHYSICAL THERAPIST

## 2018-08-14 PROCEDURE — G9169 MEMORY GOAL STATUS: HCPCS | Performed by: SPEECH-LANGUAGE PATHOLOGIST

## 2018-08-14 NOTE — THERAPY PROGRESS REPORT/RE-CERT
Outpatient Speech Language Pathology   Adult Speech Language Cognitive Progress Note  [facility]     Patient Name: Cecille Quinn  : 1970  MRN: [id number]  Today's Date: 2018         Visit Date: 2018   Patient Active Problem List   Diagnosis   • Left-sided low back pain with left-sided sciatica   • Polysubstance abuse          Visit Dx:    ICD-10-CM ICD-9-CM   1. Cognitive communication deficit R41.841 799.52                               SLP OP Goals     Row Name 18 1000          Goal Type Needed    Goal Type Needed Other Adult Goals  -GARCIA        Subjective Comments    Subjective Comments 'my mind is so confused, I need you to help me get it better.'  -GARCIA        Subjective Pain    Able to rate subjective pain? yes  -GARCIA     Pre-Treatment Pain Level 1  -GARCIA     Post-Treatment Pain Level 1  -GARCIA     Subjective Pain Comment pt reports feeling good today  -GARCIA        Other Goals    Other Adult Goal- 1 LTG 1: Pt. will demonstrate improvement in cognitive communication with intermittent cues.   -GARCIA     Status: Other Adult Goal- 1 Progressing as expected  -GARCIA     Comments: Other Adult Goal- 1 : Initiated STGs. Performing well with min-mod cues.   -GARCIA     Other Adult Goal- 2 STG 1: Pt. will immediately recall 5 unrelated words with 80% accuracy and intermittent cues.  -GARCIA     Status: Other Adult Goal- 2 Achieved  -GARCIA     Comments: Other Adult Goal- 2 : Immediately recall 5 unrelated words: 100% with one repetition each. 5 words unrelated 100% immediate recall.  -GARCIA     Other Adult Goal- 3 STG 2: Pt. will recall 3 unrelated words post 5 min with 80% accuracy and intermittent cues  -GARCIA     Status: Other Adult Goal- 3 Progressing as expected  -GARCIA     Comments: Other Adult Goal- 3 : Recall 3 unrelated words post 5 min: 67% with intermittent cues. 18; 5 minutes 0/3 without cues; 3/3 with cues; after 1 minute-3/3; after 3 minutes 2/3, 3/3 with cues.  -GARCIA     Other Adult Goal- 4 STG 3: Pt.  will recall details from 50-65 wd. paragraphs with 80% accuracy and intermittent cues.   -GARCIA     Status: Other Adult Goal- 4 Progressing as expected  -GARCIA     Comments: Other Adult Goal- 4 7/30: Immediate recall of info from 1 min news: 70% with intermittent cues. 8/14/18: recall from paragraphs   -GARCIA     Other Adult Goal- 5 STG 4: Pt. will complete ongoing evaluation for organization, reasoning, and executive functioning.   -GARCIA     Status: Other Adult Goal- 5 Achieved  -GARCIA     Comments: Other Adult Goal- 5 7/23: Completed and new STGs added PRN.  -GARCIA     Other Adult Goal- 6 STG 5: Pt. will complete mod-complex deductive reasoning puzzles with 90% accuracy and intermittent cues.   -GARCIA     Status: Other Adult Goal- 6 Progressing as expected  -GARCIA     Comments: Other Adult Goal- 6 7/30: Deductive puzzle (2x5 grid): 80% with intermittent cues. 8/14/18: 2x5 grid 80% with intermittant cues. gave more for home practice.  -GARCIA        SLP Time Calculation    SLP Goal Re-Cert Due Date 10/07/18  -GARCIA       User Key  (r) = Recorded By, (t) = Taken By, (c) = Cosigned By    Initials Name Provider Type    Dara Mckeon, MS CCC-SLP Speech and Language Pathologist                OP SLP Education     Row Name 08/14/18 1113       Education    Barriers to Learning Other (comment0  -GARCIA    Action Taken to Address Barriers short term memory deficit- using written cues and reminder alarms.  -GARCIA    Education Provided Patient requires further education on strategies, risks;Patient participated in establishing goals and treatment plan  -GARCIA    Assessed Learning needs;Learning motivation;Learning preferences;Learning readiness  -GARCIA    Learning Motivation Strong  -GARCIA    Learning Method Explanation;Demonstration;Teach back;Written materials  -GARCIA    Teaching Response Verbalized understanding;Demonstrated understanding;Reinforcement needed  -GARCIA    Education Comments gave list of memory strategies for utilizing at home. gave homework activities to  continue education/memory skills activites.  -GARCIA      User Key  (r) = Recorded By, (t) = Taken By, (c) = Cosigned By    Initials Name Effective Dates    Dara Mckeon MS CCC-SLP 08/03/18 -                 OP SLP Assessment/Plan - 08/14/18 1000        SLP Assessment    Functional Problems Speech Language- Adult/Cognition  -GARCIA    Impact on Function: Adult Speech Language/Cognition Poor attention to task  -GARCIA    Clinical Impression: Speech Language-Adult/Congnition Cognitive Communication WFL;Moderate:  -GARCIA    Functional Problems Comment pt continues to have diffiuclty managing her schedule; recalling information; and attending to tasks for completion.  -GARCIA    Clinical Impression Comments pt continues to benefit from skilled cognitive therpay for improving cognition via strategies and drill.  -GARCIA    Please refer to paper survey for additional self-reported information Yes  -GARCIA    Please refer to items scanned into chart for additional diagnostic informaiton and handouts as provided by clinician Yes  -GARCIA    Prognosis Good (comment)  -GARCIA    Patient/caregiver participated in establishment of treatment plan and goals Yes  -GARCIA    Patient would benefit from skilled therapy intervention Yes  -GARCIA       SLP Plan    Plan Comments continue plan of care as stated to master goals stated.  -GARCIA      User Key  (r) = Recorded By, (t) = Taken By, (c) = Cosigned By    Initials Name Provider Type    Dara Mckeon MS CCC-SLP Speech and Language Pathologist             SLP Outcome Measures (last 72 hours)      SLP Outcome Measures     Row Name 08/14/18 1100 08/14/18 1000          SLP Outcome Measures    Outcome Measure Used? Adult NOMS  -GARCIA Adult NOMS  -GARCIA     Date of Onset/Exacerbation of Primary Medical Diagnosis  -- 3 to < 6 months  -GARCIA     Current Treatment Setting  -- Outpatient Rehab  -GARCIA     Patient Setting Subsequent to Discharge  -- Home  -GARCIA     Is English the Primary Language of this Patient?  -- Yes  -GARCIA     What  Language(s) was/were Used in Treatment?  -- English only  -GARCIA        Adult FCM Scores    FCM Chosen  -- Memory  -GARCIA     Memory FCM Score  -- 4  -GARCIA       User Key  (r) = Recorded By, (t) = Taken By, (c) = Cosigned By    Initials Name Effective Dates    Dara Mckeon MS CCC-SLP 08/03/18 -              Time Calculation:   SLP Start Time: 1000    Therapy Charges for Today     Code Description Service Date Service Provider Modifiers Qty    41839708470 HC ST MEMORY CURRENT 8/14/2018 Dara Claros MS CCC-SLP GN, CK 1    31464849986 HC ST MEMORY PROJECTED 8/14/2018 Dara Claros MS CCC-SLP GN, CJ 1    [id number] HC ST DEV OF COGN SKILLS EACH 15 MIN 8/14/2018 Dara Claros MS CCC-SLP  4          SLP G-Codes  SLP NOMS Used?: Yes  Functional Limitations: Memory  Memory Current Status (): At least 40 percent but less than 60 percent impaired, limited or restricted  Memory Goal Status (): At least 20 percent but less than 40 percent impaired, limited or restricted        Dara Claros MS CCC-SLP  8/14/2018
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
## 2018-08-16 ENCOUNTER — OFFICE VISIT (OUTPATIENT)
Dept: NEUROSURGERY | Facility: CLINIC | Age: 48
End: 2018-08-16

## 2018-08-16 VITALS
TEMPERATURE: 97.8 F | BODY MASS INDEX: 22.03 KG/M2 | DIASTOLIC BLOOD PRESSURE: 60 MMHG | SYSTOLIC BLOOD PRESSURE: 108 MMHG | WEIGHT: 140.4 LBS | HEIGHT: 67 IN

## 2018-08-16 DIAGNOSIS — M51.36 DISC DEGENERATION, LUMBAR: Primary | ICD-10-CM

## 2018-08-16 DIAGNOSIS — Z71.6 ENCOUNTER FOR SMOKING CESSATION COUNSELING: ICD-10-CM

## 2018-08-16 DIAGNOSIS — F19.10 POLYSUBSTANCE ABUSE (HCC): ICD-10-CM

## 2018-08-16 DIAGNOSIS — M54.42 ACUTE LEFT-SIDED LOW BACK PAIN WITH LEFT-SIDED SCIATICA: ICD-10-CM

## 2018-08-16 DIAGNOSIS — M54.9 MECHANICAL BACK PAIN: ICD-10-CM

## 2018-08-16 PROCEDURE — 99213 OFFICE O/P EST LOW 20 MIN: CPT | Performed by: PHYSICIAN ASSISTANT
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
## 2018-08-20 ENCOUNTER — HOSPITAL ENCOUNTER (OUTPATIENT)
Dept: PHYSICAL THERAPY | Facility: HOSPITAL | Age: 48
Setting detail: THERAPIES SERIES
Discharge: HOME | End: 2018-08-20

## 2018-08-20 ENCOUNTER — HOSPITAL ENCOUNTER (OUTPATIENT)
Dept: SPEECH THERAPY | Facility: HOSPITAL | Age: 48
Setting detail: THERAPIES SERIES
Discharge: HOME | End: 2018-08-20

## 2018-08-20 DIAGNOSIS — R41.841 COGNITIVE COMMUNICATION DEFICIT: Primary | ICD-10-CM

## 2018-08-20 DIAGNOSIS — M54.42 ACUTE LEFT-SIDED LOW BACK PAIN WITH LEFT-SIDED SCIATICA: Primary | ICD-10-CM

## 2018-08-20 PROCEDURE — G0515 COGNITIVE SKILLS DEVELOPMENT: HCPCS | Performed by: SPEECH-LANGUAGE PATHOLOGIST

## 2018-08-20 PROCEDURE — 97110 THERAPEUTIC EXERCISES: CPT | Performed by: PHYSICAL THERAPIST

## 2018-08-20 NOTE — THERAPY TREATMENT NOTE
Outpatient Speech Language Pathology   Adult Speech Language Cognitive Treatment Note  [facility]     Patient Name: Cecille Quinn  : 1970  MRN: [id number]  Today's Date: 2018         Visit Date: 2018   Patient Active Problem List   Diagnosis   • Left-sided low back pain with left-sided sciatica   • Polysubstance abuse          Visit Dx:    ICD-10-CM ICD-9-CM   1. Cognitive communication deficit R41.841 799.52                               SLP OP Goals     Row Name 18 0800          Goal Type Needed    Goal Type Needed Other Adult Goals  -GARCIA        Subjective Pain    Able to rate subjective pain? yes  -GARCIA     Pre-Treatment Pain Level 1  -GARCIA     Post-Treatment Pain Level 1  -GARCIA     Subjective Pain Comment pt states she feels good today  -GARCIA        Other Goals    Other Adult Goal- 1 LTG 1: Pt. will demonstrate improvement in cognitive communication with intermittent cues.   -GARCIA     Status: Other Adult Goal- 1 Progressing as expected  -GARCIA     Comments: Other Adult Goal- 1 : Initiated STGs. Performing well with min-mod cues.   -GARCIA     Other Adult Goal- 2 STG 1: Pt. will immediately recall 5 unrelated words with 80% accuracy and intermittent cues.  -GARCIA     Status: Other Adult Goal- 2 Progressing as expected  -GARCIA     Comments: Other Adult Goal- 2 : Immediately recall 5 unrelated words: 100% with one repetition each. 5 words unrelated 100% immediate recall.: 5 unrelated word recall-80% with min cues to correct errors.  -GARCIA     Other Adult Goal- 3 STG 2: Pt. will recall 3 unrelated words post 5 min with 80% accuracy and intermittent cues  -GARCIA     Status: Other Adult Goal- 3 Progressing as expected  -GARCIA     Comments: Other Adult Goal- 3 : Recall 3 unrelated words post 5 min: 67% with intermittent cues. 18; 5 minutes 0/3 without cues; 3/3 with cues; after 1 minute-3/3; after 3 minutes 2/3, 3/3 with cues.18: 1/3 I, 3/3 with cues. 3/3 ; 1/3; 2/3;   -GARCIA     Other Adult Goal- 4  STG 3: Pt. will recall details from 50-65 wd. paragraphs with 80% accuracy and intermittent cues.   -GARCIA     Status: Other Adult Goal- 4 Progressing as expected  -GARCIA     Comments: Other Adult Goal- 4 7/30: Immediate recall of info from 1 min news: 70% with intermittent cues. 8/14/18: recall from paragraphs 8/20/18: immediate recall of 65word paragraphs given specific questions 100%  -GARCIA     Other Adult Goal- 5 STG 4: Pt. will complete ongoing evaluation for organization, reasoning, and executive functioning.   -GARCIA     Status: Other Adult Goal- 5 Achieved  -GARCIA     Comments: Other Adult Goal- 5 7/23: Completed and new STGs added PRN.  -GARCIA     Other Adult Goal- 6 STG 5: Pt. will complete mod-complex deductive reasoning puzzles with 90% accuracy and intermittent cues.   -GARCIA     Status: Other Adult Goal- 6 Progressing as expected  -GARCIA     Comments: Other Adult Goal- 6 7/30: Deductive puzzle (2x5 grid): 80% with intermittent cues. 8/14/18: 2x5 grid 80% with intermittant cues. gave more for home practice.8/20/18: pt requires cues for 3x4 grid completion however, improved responses and accuracy.  -GARCIA        SLP Time Calculation    SLP Goal Re-Cert Due Date 10/07/18  -GARCIA       User Key  (r) = Recorded By, (t) = Taken By, (c) = Cosigned By    Initials Name Provider Type    Dara Mckeon MS CCC-SLP Speech and Language Pathologist                OP SLP Education     Row Name 08/20/18 0800       Education    Learning Motivation Strong  -GARCIA    Learning Method Demonstration;Teach back;Explanation;Written materials  -GARCIA    Teaching Response Demonstrated understanding;Reinforcement needed  -GARCIA    Education Comments gave strategies and homework activities to work on short term memory  -GARCIA      User Key  (r) = Recorded By, (t) = Taken By, (c) = Cosigned By    Initials Name Effective Dates    Dara Mckeon MS CCC-SLP 08/03/18 -                 OP SLP Assessment/Plan - 08/20/18 1031        SLP Plan    Plan Comments continue  plan of care as stated. increase goals for attention and focus to strengthen short term recall.  -GARCIA      User Key  (r) = Recorded By, (t) = Taken By, (c) = Cosigned By    Initials Name Provider Type    Dara Mckeon, MS CCC-SLP Speech and Language Pathologist                 Time Calculation:   SLP Start Time: 0800    Therapy Charges for Today     Code Description Service Date Service Provider Modifiers Qty    79352787736 HC ST DEV OF COGN SKILLS EACH 15 MIN 8/20/2018 Dara Claros, MS CCC-SLP  4                   Dara Claros MS CCC-SLP  8/20/2018
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
## 2018-08-22 ENCOUNTER — HOSPITAL ENCOUNTER (OUTPATIENT)
Dept: MRI IMAGING | Facility: HOSPITAL | Age: 48
Discharge: HOME | End: 2018-08-22
Admitting: PHYSICIAN ASSISTANT

## 2018-08-22 PROCEDURE — 72148 MRI LUMBAR SPINE W/O DYE: CPT

## 2018-08-27 ENCOUNTER — OFFICE VISIT (OUTPATIENT)
Dept: NEUROSURGERY | Facility: CLINIC | Age: 48
End: 2018-08-27

## 2018-08-27 ENCOUNTER — HOSPITAL ENCOUNTER (OUTPATIENT)
Dept: PHYSICAL THERAPY | Facility: HOSPITAL | Age: 48
Setting detail: THERAPIES SERIES
Discharge: HOME | End: 2018-08-27

## 2018-08-27 ENCOUNTER — HOSPITAL ENCOUNTER (OUTPATIENT)
Dept: SPEECH THERAPY | Facility: HOSPITAL | Age: 48
Setting detail: THERAPIES SERIES
Discharge: HOME | End: 2018-08-27

## 2018-08-27 VITALS
BODY MASS INDEX: 22.13 KG/M2 | SYSTOLIC BLOOD PRESSURE: 110 MMHG | TEMPERATURE: 98 F | DIASTOLIC BLOOD PRESSURE: 68 MMHG | WEIGHT: 141 LBS | HEIGHT: 67 IN

## 2018-08-27 DIAGNOSIS — R41.841 COGNITIVE COMMUNICATION DEFICIT: Primary | ICD-10-CM

## 2018-08-27 DIAGNOSIS — M54.42 ACUTE LEFT-SIDED LOW BACK PAIN WITH LEFT-SIDED SCIATICA: Primary | ICD-10-CM

## 2018-08-27 DIAGNOSIS — M54.9 MECHANICAL BACK PAIN: ICD-10-CM

## 2018-08-27 DIAGNOSIS — Z71.6 ENCOUNTER FOR SMOKING CESSATION COUNSELING: ICD-10-CM

## 2018-08-27 DIAGNOSIS — M54.42 ACUTE LEFT-SIDED LOW BACK PAIN WITH LEFT-SIDED SCIATICA: ICD-10-CM

## 2018-08-27 DIAGNOSIS — M51.36 DISC DEGENERATION, LUMBAR: Primary | ICD-10-CM

## 2018-08-27 PROCEDURE — 97110 THERAPEUTIC EXERCISES: CPT | Performed by: PHYSICAL THERAPIST

## 2018-08-27 PROCEDURE — 99213 OFFICE O/P EST LOW 20 MIN: CPT | Performed by: PHYSICIAN ASSISTANT

## 2018-08-27 PROCEDURE — G0515 COGNITIVE SKILLS DEVELOPMENT: HCPCS | Performed by: SPEECH-LANGUAGE PATHOLOGIST

## 2018-08-27 PROCEDURE — 99406 BEHAV CHNG SMOKING 3-10 MIN: CPT | Performed by: PHYSICIAN ASSISTANT

## 2018-08-27 RX ORDER — PERPHENAZINE AND AMITRIPTYLINE HYDROCHLORIDE 4; 50 MG/1; MG/1
TABLET, FILM COATED ORAL
Status: ON HOLD | COMMUNITY
Start: 2018-08-23 | End: 2019-05-06

## 2018-08-27 NOTE — THERAPY TREATMENT NOTE
Outpatient Speech Language Pathology   Adult Speech Language Cognitive Treatment Note  [facility]     Patient Name: Cecille Quinn  : 1970  MRN: [id number]  Today's Date: 2018         Visit Date: 2018   Patient Active Problem List   Diagnosis   • Left-sided low back pain with left-sided sciatica   • Polysubstance abuse          Visit Dx:    ICD-10-CM ICD-9-CM   1. Cognitive communication deficit R41.841 799.52                               SLP OP Goals     Row Name 18 0800          Goal Type Needed    Goal Type Needed Other Adult Goals  -GARCIA        Subjective Comments    Subjective Comments pt states her delayed memory continues to be an issue  -GARCIA        Subjective Pain    Able to rate subjective pain? yes  -GARCIA     Pre-Treatment Pain Level 3  -GARCIA     Post-Treatment Pain Level 3  -GARCIA     Subjective Pain Comment lower back   -GARCIA        Other Goals    Other Adult Goal- 1 LTG 1: Pt. will demonstrate improvement in cognitive communication with intermittent cues.   -GARCIA     Status: Other Adult Goal- 1 Progressing as expected  -GARCIA     Comments: Other Adult Goal- 1 2018: pt is progressing and has demonstrated improved cognitive communicaiton in the areas of problem solving and reasoning. : Initiated STGs. Performing well with min-mod cues.   -GARCIA     Other Adult Goal- 2 STG 1: Pt. will immediately recall 5 unrelated words with 80% accuracy and intermittent cues.  -GARCIA     Status: Other Adult Goal- 2 Progressing as expected  -GARCIA     Comments: Other Adult Goal- 2 2018: 80% no cues. 100% with cues.: Immediately recall 5 unrelated words: 100% with one repetition each. 5 words unrelated 100% immediate recall.: 5 unrelated word recall-80% with min cues to correct errors.  -GARCIA     Other Adult Goal- 3 STG 2: Pt. will recall 3 unrelated words post 5 min with 80% accuracy and intermittent cues  -GARCIA     Status: Other Adult Goal- 3 Progressing as expected  -GARCIA     Comments: Other Adult  Goal- 3 8/27/2018: 3 unrelated word recall2/3 without cues; 3/3 with cues; 1/3 without cues; 2/3 with. employed strategies of making a story and writing the words with improved results.  7/30: Recall 3 unrelated words post 5 min: 67% with intermittent cues. 8/14/18; 5 minutes 0/3 without cues; 3/3 with cues; after 1 minute-3/3; after 3 minutes 2/3, 3/3 with cues.8/20/18: 1/3 I, 3/3 with cues. 3/3 ; 1/3; 2/3;   -GARCIA     Other Adult Goal- 4 STG 3: Pt. will recall details from 50-65 wd. paragraphs with 80% accuracy and intermittent cues.   -GARCIA     Status: Other Adult Goal- 4 Progressing as expected  -GARCIA     Comments: Other Adult Goal- 4 8/27/2018: 9/10; 8/10; 8/10.  7/30: Immediate recall of info from 1 min news: 70% with intermittent cues. 8/14/18: recall from paragraphs 8/20/18: immediate recall of 65word paragraphs given specific questions 100%  -GARCIA     Other Adult Goal- 5 STG 4: Pt. will complete ongoing evaluation for organization, reasoning, and executive functioning.   -GARCIA     Status: Other Adult Goal- 5 Achieved  -GARCIA     Comments: Other Adult Goal- 5 7/23: Completed and new STGs added PRN.  -GARCIA     Other Adult Goal- 6 STG 5: Pt. will complete mod-complex deductive reasoning puzzles with 90% accuracy and intermittent cues.   -GARCIA     Status: Other Adult Goal- 6 Progressing as expected  -GARCIA     Comments: Other Adult Goal- 6 8/27/2018: deductive reasioning puzzles homework 100%. pt downloaded apps for reasoning as well on her phone.7/30: Deductive puzzle (2x5 grid): 80% with intermittent cues. 8/14/18: 2x5 grid 80% with intermittant cues. gave more for home practice.8/20/18: pt requires cues for 3x4 grid completion however, improved responses and accuracy.  -GARCIA        SLP Time Calculation    SLP Goal Re-Cert Due Date 10/07/18  -GARCIA       User Key  (r) = Recorded By, (t) = Taken By, (c) = Cosigned By    Initials Name Provider Type    Dara Mckeon, MS CCC-SLP Speech and Language Pathologist                OP SLP  Education     Row Name 08/27/18 0855       Education    Assessed Learning motivation;Learning needs;Learning preferences;Learning readiness  -GARCIA    Learning Motivation Strong  -GARCIA    Learning Method Explanation;Demonstration;Written materials  -GARCIA    Teaching Response Verbalized understanding;Reinforcement needed  -GARCIA    Education Comments ongoing strategies   -GARCIA      User Key  (r) = Recorded By, (t) = Taken By, (c) = Cosigned By    Initials Name Effective Dates    Dara Mckeon MS CCC-SLP 08/03/18 -                 OP SLP Assessment/Plan - 08/27/18 0853        SLP Assessment    Functional Problems Speech Language- Adult/Cognition  -GARCIA    Impact on Function: Adult Speech Language/Cognition Trouble learning or remembering new information;Difficulty following directions;Restrictions in personal and social life  -GARCIA    Clinical Impression: Speech Language-Adult/Congnition Moderate:;Cognitive Communication Impairment  -GARCIA    Please refer to paper survey for additional self-reported information Yes  -GARCIA    Please refer to items scanned into chart for additional diagnostic informaiton and handouts as provided by clinician Yes  -GARCIA    Prognosis Good (comment)  -GARCIA    Patient/caregiver participated in establishment of treatment plan and goals Yes  -GARCIA    Patient would benefit from skilled therapy intervention Yes  -GARCIA       SLP Plan    Planned CPT's? SLP DEVEL COG SKILLS (PER 15 MINUTES): 36087  -GARCIA    Expected Duration Therapy Session - minutes 45-60 minutes  -GARCIA    Plan Comments continue plan of care implementing memory strategies for carryover  -GARCIA      User Key  (r) = Recorded By, (t) = Taken By, (c) = Cosigned By    Initials Name Provider Type    Dara Mckeon, MS CCC-SLP Speech and Language Pathologist                 Time Calculation:   SLP Start Time: 0800  Total Timed Code Minutes- SLP: 60 minute(s)    Therapy Charges for Today     Code Description Service Date Service Provider Modifiers Qty     23966839016 [facility] DEV OF COGN SKILLS EACH 15 MIN 8/27/2018 Dara Claros, MS CCC-SLP  4                   Dara Claros, MS CCC-SLP  8/27/2018
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
## 2018-09-10 ENCOUNTER — DOCUMENTATION (OUTPATIENT)
Dept: PHYSICAL THERAPY | Facility: HOSPITAL | Age: 48
End: 2018-09-10

## 2018-09-10 ENCOUNTER — HOSPITAL ENCOUNTER (OUTPATIENT)
Dept: SPEECH THERAPY | Facility: HOSPITAL | Age: 48
Setting detail: THERAPIES SERIES
Discharge: HOME | End: 2018-09-10

## 2018-09-10 ENCOUNTER — APPOINTMENT (OUTPATIENT)
Dept: PHYSICAL THERAPY | Facility: HOSPITAL | Age: 48
End: 2018-09-10

## 2018-09-10 DIAGNOSIS — M54.42 ACUTE LEFT-SIDED LOW BACK PAIN WITH LEFT-SIDED SCIATICA: Primary | ICD-10-CM

## 2018-09-10 DIAGNOSIS — R41.841 COGNITIVE COMMUNICATION DEFICIT: Primary | ICD-10-CM

## 2018-09-10 PROCEDURE — G9168 MEMORY CURRENT STATUS: HCPCS | Performed by: SPEECH-LANGUAGE PATHOLOGIST

## 2018-09-10 PROCEDURE — G9169 MEMORY GOAL STATUS: HCPCS | Performed by: SPEECH-LANGUAGE PATHOLOGIST

## 2018-09-10 PROCEDURE — G0515 COGNITIVE SKILLS DEVELOPMENT: HCPCS | Performed by: SPEECH-LANGUAGE PATHOLOGIST

## 2018-09-10 NOTE — THERAPY PROGRESS REPORT/RE-CERT
Outpatient Speech Language Pathology   Adult Speech Language Cognitive Progress Note  [facility]     Patient Name: Cecille Quinn  : 1970  MRN: [id number]  Today's Date: 9/10/2018         Visit Date: 09/10/2018   Patient Active Problem List   Diagnosis   • Left-sided low back pain with left-sided sciatica   • Polysubstance abuse          Visit Dx:    ICD-10-CM ICD-9-CM   1. Cognitive communication deficit R41.841 799.52                               SLP OP Goals     Row Name 09/10/18 0800          Goal Type Needed    Goal Type Needed Other Adult Goals  -GARCIA        Subjective Comments    Subjective Comments pt feels well today, still getting lost on the way here at times. reviewed strategies  -GARCIA        Subjective Pain    Able to rate subjective pain? yes  -GARCIA     Pre-Treatment Pain Level 0  -GARCIA     Post-Treatment Pain Level 0  -GARCIA        Other Goals    Other Adult Goal- 1 LTG 1: Pt. will demonstrate improvement in cognitive communication with intermittent cues.   -GARCIA     Status: Other Adult Goal- 1 Progressing as expected  -GARCIA     Comments: Other Adult Goal- 1 9/10/2018: pt improving with use of cues. requires more strategies and training.  2018: pt is progressing and has demonstrated improved cognitive communicaiton in the areas of problem solving and reasoning. : Initiated STGs. Performing well with min-mod cues.   -GARCIA     Other Adult Goal- 2 STG 1: Pt. will immediately recall 5 unrelated words with 80% accuracy and intermittent cues.  -GARCIA     Status: Other Adult Goal- 2 Progressing as expected  -GARCIA     Comments: Other Adult Goal- 2 9/10/2018: 2018: 80% no cues. 100% with cues.: Immediately recall 5 unrelated words: 100% with one repetition each. 5 words unrelated 100% immediate recall.: 5 unrelated word recall-80% with min cues to correct errors.  -GARCIA     Other Adult Goal- 3 STG 2: Pt. will recall 3 unrelated words post 5 min with 80% accuracy and intermittent cues  -GARCIA     Status:  Other Adult Goal- 3 Progressing as expected  -GARCIA     Comments: Other Adult Goal- 3 9/10/2018:1/3: 2/3: 2/3: requires cues to recall the story added layer of writing down the words. 3/3 after writing down the words for recall later.  8/27/2018: 3 unrelated word recall2/3 without cues; 3/3 with cues; 1/3 without cues; 2/3 with. employed strategies of making a story and writing the words with improved results.  7/30: Recall 3 unrelated words post 5 min: 67% with intermittent cues. 8/14/18; 5 minutes 0/3 without cues; 3/3 with cues; after 1 minute-3/3; after 3 minutes 2/3, 3/3 with cues.8/20/18: 1/3 I, 3/3 with cues. 3/3 ; 1/3; 2/3;   -GARCIA     Other Adult Goal- 4 STG 3: Pt. will recall details from 50-65 wd. paragraphs with 80% accuracy and intermittent cues.   -GARCIA     Status: Other Adult Goal- 4 Progressing as expected  -GARCIA     Comments: Other Adult Goal- 4 9/10/2018: 8/27/2018: 9/10; 8/10; 8/10.  7/30: Immediate recall of info from 1 min news: 70% with intermittent cues. 8/14/18: recall from paragraphs 8/20/18: immediate recall of 65word paragraphs given specific questions 100%  -GARCIA     Other Adult Goal- 5 STG 4: Pt. will complete ongoing evaluation for organization, reasoning, and executive functioning.   -GARCIA     Status: Other Adult Goal- 5 Achieved  -GARCIA     Comments: Other Adult Goal- 5 7/23: Completed and new STGs added PRN.  -GARCIA     Other Adult Goal- 6 STG 5: Pt. will complete mod-complex deductive reasoning puzzles with 90% accuracy and intermittent cues.   -GARCIA     Status: Other Adult Goal- 6 Progressing as expected  -GARCIA     Comments: Other Adult Goal- 6 9/10/2018: reviewed homework 75% correct. re-did one mind [place]. 8/27/2018: deductive reasioning puzzles homework 100%. pt downloaded apps for reasoning as well on her phone.7/30: Deductive puzzle (2x5 grid): 80% with intermittent cues. 8/14/18: 2x5 grid 80% with intermittant cues. gave more for home practice.8/20/18: pt requires cues for 3x4 grid completion  however, improved responses and accuracy.  -GARCIA        SLP Time Calculation    SLP Goal Re-Cert Due Date 10/07/18  -GARCIA       User Key  (r) = Recorded By, (t) = Taken By, (c) = Cosigned By    Initials Name Provider Type    Dara Mckeon MS CCC-SLP Speech and Language Pathologist                OP SLP Education     Row Name 09/10/18 0800       Education    Barriers to Learning No barriers identified  -GARCIA    Education Provided Patient requires further education on strategies, risks  -GARCIA    Assessed Learning preferences;Learning readiness;Learning motivation;Learning needs  -GARCIA    Learning Motivation Strong  -GARCIA    Learning Method Explanation;Demonstration;Teach back;Written materials  -GARCIA    Teaching Response Verbalized understanding;Demonstrated understanding;Reinforcement needed  -GARCIA    Education Comments ongoing  -GARCIA      User Key  (r) = Recorded By, (t) = Taken By, (c) = Cosigned By    Initials Name Effective Dates    Dara Mckeon MS CCC-SLP 08/03/18 -                 OP SLP Assessment/Plan - 09/10/18 0856        SLP Assessment    Functional Problems Speech Language- Adult/Cognition  -GARCIA    Impact on Function: Adult Speech Language/Cognition Difficulty following directions;Restrictions in personal and social life;Difficulty participating in avocational activities;Trouble learning or remembering new information;Unable to complete specified job requirements;Difficulty sequencing thoughts to express complex messages  -GARCIA    Clinical Impression: Speech Language-Adult/Congnition Mild-Moderate:;Cognitive Communication Impairment  -GARCIA    Please refer to paper survey for additional self-reported information Yes  -GARCIA    Please refer to items scanned into chart for additional diagnostic informaiton and handouts as provided by clinician Yes  -GARCIA    Prognosis Good (comment)  -GARCIA    Patient/caregiver participated in establishment of treatment plan and goals Yes  -GARCIA    Patient would benefit from skilled therapy  intervention Yes  -GARCIA      User Key  (r) = Recorded By, (t) = Taken By, (c) = Cosigned By    Initials Name Provider Type    Dara Mckeon MS CCC-SLP Speech and Language Pathologist             SLP Outcome Measures (last 72 hours)      SLP Outcome Measures     Row Name 09/10/18 0800             SLP Outcome Measures    Date of Onset/Exacerbation of Primary Medical Diagnosis 0 to < 3 months  -GARCIA      Current Treatment Setting Outpatient Rehab  -GARCIA      Patient Setting Subsequent to Discharge Home  -GARCIA      Is English the Primary Language of this Patient? Yes  -GARCIA      What Language(s) was/were Used in Treatment? English only  -         Adult FCM Scores    FCM Chosen Memory  -      Memory FCM Score 4  -GARCIA        User Key  (r) = Recorded By, (t) = Taken By, (c) = Cosigned By    Initials Name Effective Dates    Dara Mckeon MS CCC-SLP 08/03/18 -              Time Calculation:   SLP Start Time: 0810  Total Timed Code Minutes- SLP: 55 minute(s)    Therapy Charges for Today     Code Description Service Date Service Provider Modifiers Qty    56405444350 HC ST MEMORY CURRENT 9/10/2018 Dara Claros MS CCC-SLP GN, CK 1    20950621994 HC ST MEMORY PROJECTED 9/10/2018 Dara Claros MS CCC-SLP GN, CJ 1    07589712643 HC ST DEV OF COGN SKILLS EACH 15 MIN 9/10/2018 Dara Claros MS CCC-SLP  4          SLP G-Codes  SLP NOMS Used?: Yes  Functional Limitations: Memory  Memory Current Status (): At least 40 percent but less than 60 percent impaired, limited or restricted  Memory Goal Status (): At least 20 percent but less than 40 percent impaired, limited or restricted        Dara Claros MS CCC-SLP  9/10/2018

## 2018-09-10 NOTE — THERAPY DISCHARGE NOTE
Outpatient Physical Therapy Discharge Summary         Patient Name: Cecille Quinn  : 1970  MRN: [id number]    Today's Date: 9/10/2018    Visit Dx:    ICD-10-CM ICD-9-CM   1. Acute left-sided low back pain with left-sided sciatica M54.42 724.2     724.3             PT OP Goals     Row Name 09/10/18 1300          PT Short Term Goals    STG Date to Achieve 18  -JEANINE     STG 1 Patient to report pre treatment pain </=3/10  -JEANINE     STG 1 Progress Met  -JEANINE     STG 2 Patient to report improved sitting tolerance  -JEANINE     STG 2 Progress Partially Met  -EJANINE        Long Term Goals    LTG Date to Achieve 18  -JEANINE     LTG 1 Patient to be independent with final HEP via handouts  -JEANINE     LTG 1 Progress Not Met;Progressing  -JEANINE     LTG 2 Patient to improve Mod Oswestry score to at least 40%  -JEANINE     LTG 2 Progress Ongoing  -JEANINE     LTG 3 Patient to demonstrate minimal pain with lumbar extension  -JEANINE     LTG 3 Progress Ongoing;Progressing  -JEANINE     LTG 4 Patient to report her worst pain </= 4/10  -JEANINE     LTG 4 Progress Met  -JEANINE       User Key  (r) = Recorded By, (t) = Taken By, (c) = Cosigned By    Initials Name Provider Type    Lan Gonsalez, PT Physical Therapist          OP PT Discharge Summary  Date of Discharge: 09/10/18  Reason for Discharge: Lack of progress, Patient/Caregiver request  Outcomes Achieved: Patient able to partially acheive established goals, Refer to plan of care for updates on goals achieved  Discharge Destination: Home with home program  Discharge Instructions/Additional Comments: Patient stopped attending PT after MD follow up, she was to go to Pain Management.  visits attended      Time Calculation:        Therapy Suggested Charges     Code   Minutes Charges    None                       Lan William, PT  9/10/2018

## 2018-09-17 ENCOUNTER — APPOINTMENT (OUTPATIENT)
Dept: PHYSICAL THERAPY | Facility: HOSPITAL | Age: 48
End: 2018-09-17

## 2018-09-17 ENCOUNTER — HOSPITAL ENCOUNTER (OUTPATIENT)
Dept: SPEECH THERAPY | Facility: HOSPITAL | Age: 48
Setting detail: THERAPIES SERIES
Discharge: HOME | End: 2018-09-17

## 2018-09-17 DIAGNOSIS — R41.841 COGNITIVE COMMUNICATION DEFICIT: Primary | ICD-10-CM

## 2018-09-17 PROCEDURE — G0515 COGNITIVE SKILLS DEVELOPMENT: HCPCS | Performed by: SPEECH-LANGUAGE PATHOLOGIST

## 2018-09-17 NOTE — THERAPY TREATMENT NOTE
Outpatient Speech Language Pathology   Adult Speech Language Cognitive Treatment Note  [facility]     Patient Name: Cecille Quinn  : 1970  MRN: [id number]  Today's Date: 2018         Visit Date: 2018   Patient Active Problem List   Diagnosis   • Left-sided low back pain with left-sided sciatica   • Polysubstance abuse          Visit Dx:    ICD-10-CM ICD-9-CM   1. Cognitive communication deficit R41.841 799.52                               SLP OP Goals     Row Name 18 0800          Goal Type Needed    Goal Type Needed Other Adult Goals  -GARCIA        Subjective Comments    Subjective Comments pt very motivated and cooperative for tx activites  -GARCIA        Subjective Pain    Able to rate subjective pain? yes  -GARCIA     Pre-Treatment Pain Level 6  -GARCIA     Post-Treatment Pain Level 6  -GARCIA     Subjective Pain Comment back pain  -GARCIA        Other Goals    Other Adult Goal- 1 LTG 1: Pt. will demonstrate improvement in cognitive communication with intermittent cues.   -GARCIA     Status: Other Adult Goal- 1 Progressing as expected  -GARCIA     Comments: Other Adult Goal- 1 9/10/2018: pt improving with use of cues. requires more strategies and training.  2018: pt is progressing and has demonstrated improved cognitive communicaiton in the areas of problem solving and reasoning. : Initiated STGs. Performing well with min-mod cues.   -GARCIA     Other Adult Goal- 2 STG 1: Pt. will immediately recall 5 unrelated words with 80% accuracy and intermittent cues.  -GARCIA     Status: Other Adult Goal- 2 Progressing as expected  -GARCIA     Comments: Other Adult Goal- 2  9/10/2018: 2018: 80% no cues. 100% with cues.: Immediately recall 5 unrelated words: 100% with one repetition each. 5 words unrelated 100% immediate recall.: 5 unrelated word recall-80% with min cues to correct errors.  -GARCIA     Other Adult Goal- 3 STG 2: Pt. will recall 3 unrelated words post 5 min with 80% accuracy and intermittent cues  -GARCIA      Status: Other Adult Goal- 3 Progressing as expected  -GARCIA     Comments: Other Adult Goal- 3 9/17/2018: 2/3-3 min; 2/3 5 min; 1/3-5 min; 1/3 5 min. 9/10/2018:1/3: 2/3: 2/3: requires cues to recall the story added layer of writing down the words. 3/3 after writing down the words for recall later.  8/27/2018: 3 unrelated word recall2/3 without cues; 3/3 with cues; 1/3 without cues; 2/3 with. employed strategies of making a story and writing the words with improved results.  7/30: Recall 3 unrelated words post 5 min: 67% with intermittent cues. 8/14/18; 5 minutes 0/3 without cues; 3/3 with cues; after 1 minute-3/3; after 3 minutes 2/3, 3/3 with cues.8/20/18: 1/3 I, 3/3 with cues. 3/3 ; 1/3; 2/3;   -GARCIA     Other Adult Goal- 4 STG 3: Pt. will recall details from 50-65 wd. paragraphs with 80% accuracy and intermittent cues.   -GARCIA     Status: Other Adult Goal- 4 Progressing as expected  -GARCIA     Comments: Other Adult Goal- 4 9/17/2018: talk path news-2 news stories 6/6.  9/10/2018: 8/27/2018: 9/10; 8/10; 8/10.  7/30: Immediate recall of info from 1 min news: 70% with intermittent cues. 8/14/18: recall from paragraphs 8/20/18: immediate recall of 65word paragraphs given specific questions 100%  -GARCIA     Other Adult Goal- 5 STG 4: Pt. will complete ongoing evaluation for organization, reasoning, and executive functioning.   -GARCIA     Status: Other Adult Goal- 5 Achieved  -GARCIA     Comments: Other Adult Goal- 5 9/17/2018: administered APT test results indicate decreased attention in complex sustained, selective attention divided and alternating attention. 7/23: Completed and new STGs added PRN.  -GARCIA     Other Adult Goal- 6 STG 5: Pt. will complete mod-complex deductive reasoning puzzles with 90% accuracy and intermittent cues.   -GARCIA     Status: Other Adult Goal- 6 Progressing as expected  -GARCIA     Comments: Other Adult Goal- 6 9/17/2018: gave deductive reasoning puzzles for homework. 9/10/2018: reviewed homework 75% correct. re-did  one mind [place]. 8/27/2018: deductive reasioning puzzles homework 100%. pt downloaded apps for reasoning as well on her phone.7/30: Deductive puzzle (2x5 grid): 80% with intermittent cues. 8/14/18: 2x5 grid 80% with intermittant cues. gave more for home practice.8/20/18: pt requires cues for 3x4 grid completion however, improved responses and accuracy.  -GARCIA        SLP Time Calculation    SLP Goal Re-Cert Due Date 10/07/18  -GARCIA       User Key  (r) = Recorded By, (t) = Taken By, (c) = Cosigned By    Initials Name Provider Type    Dara Mckeon MS CCC-SLP Speech and Language Pathologist                OP SLP Education     Row Name 09/17/18 0902       Education    Barriers to Learning No barriers identified  -GARCIA    Learning Motivation Strong  -GARCIA    Learning Method Explanation;Demonstration;Teach back;Written materials  -GARCIA    Teaching Response Verbalized understanding;Demonstrated understanding  -GARCIA    Education Comments ongoing for memory strategies; gave homework for deductive reasoning  -GARCIA      User Key  (r) = Recorded By, (t) = Taken By, (c) = Cosigned By    Initials Name Effective Dates    Dara Mckeon MS CCC-SLP 08/03/18 -                 OP SLP Assessment/Plan - 09/17/18 0800        SLP Assessment    Functional Problems Speech Language- Adult/Cognition  -GRACIA    Prognosis Good (comment)  -GARCIA       SLP Plan    Expected Duration Therapy Session - minutes 45-60 minutes  -GARCIA    Plan Comments continue plan of care; gave APT test for attention  -GARCIA      User Key  (r) = Recorded By, (t) = Taken By, (c) = Cosigned By    Initials Name Provider Type    Dara Mckeon MS CCC-SLP Speech and Language Pathologist                 Time Calculation:   SLP Start Time: 0800    Therapy Charges for Today     Code Description Service Date Service Provider Modifiers Qty    14649924738 HC ST DEV OF COGN SKILLS EACH 15 MIN 9/17/2018 Dara Claros MS CCC-SLP  4                   Dara Claros MS CCC-SLP  9/17/2018

## 2018-09-28 ENCOUNTER — OFFICE VISIT (OUTPATIENT)
Dept: INTERNAL MEDICINE | Facility: CLINIC | Age: 48
End: 2018-09-28

## 2018-09-28 VITALS
HEIGHT: 67 IN | OXYGEN SATURATION: 99 % | RESPIRATION RATE: 16 BRPM | DIASTOLIC BLOOD PRESSURE: 64 MMHG | TEMPERATURE: 97.7 F | BODY MASS INDEX: 22.7 KG/M2 | HEART RATE: 75 BPM | WEIGHT: 144.6 LBS | SYSTOLIC BLOOD PRESSURE: 108 MMHG

## 2018-09-28 DIAGNOSIS — M25.842 CYST OF JOINT OF HAND, LEFT: ICD-10-CM

## 2018-09-28 DIAGNOSIS — M25.511 ACUTE PAIN OF RIGHT SHOULDER: Primary | ICD-10-CM

## 2018-09-28 PROCEDURE — 99213 OFFICE O/P EST LOW 20 MIN: CPT | Performed by: PHYSICIAN ASSISTANT

## 2018-09-28 RX ORDER — GABAPENTIN 300 MG/1
300 CAPSULE ORAL 2 TIMES DAILY PRN
COMMUNITY
Start: 2018-09-14 | End: 2020-04-21 | Stop reason: ALTCHOICE

## 2018-09-28 RX ORDER — APIXABAN 5 MG/1
1 TABLET, FILM COATED ORAL 2 TIMES DAILY
Qty: 60 TABLET | Refills: 5 | Status: ON HOLD | OUTPATIENT
Start: 2018-09-28 | End: 2019-05-06

## 2018-09-28 RX ORDER — PREDNISONE 20 MG/1
20 TABLET ORAL DAILY
Qty: 5 TABLET | Refills: 0 | Status: SHIPPED | OUTPATIENT
Start: 2018-09-28 | End: 2018-10-03

## 2018-09-28 NOTE — PROGRESS NOTES
Chief Complaint   Patient presents with   • Arm Pain     right x2 weeks, has been taking nsaid OTC   • Cyst     x 1 month, tender to touch       Subjective       History of Present Illness     Cecille Quinn is a 48 y.o. female. She presents with new problems including right shoulder pain and cyst of left hand. Pt and her partner report the history. Regarding shoulder pain, pt states this began about 2 weeks ago. She states that she woke up with sore, achy right shoulder one morning and thought she had just slept on it wrong, especially as she had mowed the grass the day before and possibly overworked it. However, pain has persisted and worsened. Pain is at her shoulder blade posteriorly, as well as at the front of her shoulder. She states it is stiff and sore, but also some sharp shooting pains with certain movements. She is having a hard time lifting things. She has decreased ROM. She has tried OTC ibuprofen without significant relief of symptoms.  She has not tried a heating pad. She is in PT for back pain, and has discussed shoulder pain with PT but has not worked on shoulder pain extensively.     Patient also has a small cyst on the palm of her left hand. This has been present for at least 6 months. It is not painful or tender to touch. She has no decrease ROM. It is not affecting her daily life activities. She has not tried anything for the treatment of this issue.       The following portions of the patient's history were reviewed and updated as appropriate: allergies, current medications, past medical history, past social history and problem list.    Allergies   Allergen Reactions   • Cymbalta [Duloxetine Hcl] Other (See Comments)     blisters   • Paxil [Paroxetine Hcl] Other (See Comments)     Eye twitching       Social History   Substance Use Topics   • Smoking status: Current Some Day Smoker   • Smokeless tobacco: Current User   • Alcohol use No         Current Outpatient Prescriptions:   •   acetaminophen (TYLENOL) 500 MG tablet, Take 500 mg by mouth Every 6 (Six) Hours As Needed for Mild Pain  (3000mg prn)., Disp: , Rfl:   •  clonazePAM (KlonoPIN) 1 MG tablet, Take 1 mg by mouth 2 (Two) Times a Day As Needed for Seizures., Disp: , Rfl:   •  donepezil (ARICEPT) 10 MG tablet, Take 1 PO daily, Disp: 30 tablet, Rfl: 5  •  ELIQUIS 5 MG tablet tablet, Take 1 tablet by mouth 2 (Two) Times a Day., Disp: 60 tablet, Rfl: 5  •  folic acid (FOLVITE) 400 MCG tablet, Take 1 tablet by mouth Daily., Disp: 30 tablet, Rfl: 11  •  Perphenazine-Amitriptyline 4-50 MG tablet, , Disp: , Rfl:   •  gabapentin (NEURONTIN) 300 MG capsule, Take 1 mg by mouth As Needed., Disp: , Rfl:   •  predniSONE (DELTASONE) 20 MG tablet, Take 1 tablet by mouth Daily for 5 days., Disp: 5 tablet, Rfl: 0    Review of Systems   Constitutional: Negative for chills, fatigue and fever.   HENT: Negative for congestion and sore throat.    Eyes: Negative for pain.   Respiratory: Negative for cough and shortness of breath.    Cardiovascular: Negative for chest pain and palpitations.   Gastrointestinal: Negative for abdominal pain, diarrhea, nausea and vomiting.   Genitourinary: Negative for dysuria and hematuria.   Musculoskeletal: Positive for arthralgias and back pain (chronic).   Skin: Positive for skin lesions. Negative for rash.   Neurological: Negative for dizziness and headache.   Hematological: Does not bruise/bleed easily.       Objective   Vitals:    09/28/18 1254   BP: 108/64   Pulse: 75   Resp: 16   Temp: 97.7 °F (36.5 °C)   SpO2: 99%     Physical Exam   Constitutional: She appears well-developed and well-nourished.   HENT:   Head: Normocephalic and atraumatic.   Right Ear: Tympanic membrane, external ear and ear canal normal.   Left Ear: Tympanic membrane, external ear and ear canal normal.   Mouth/Throat: Oropharynx is clear and moist and mucous membranes are normal.   Eyes: Conjunctivae are normal.   Neck: Normal range of motion. Neck  supple.   Cardiovascular: Normal rate, regular rhythm, normal heart sounds and intact distal pulses.    No murmur heard.  Pulmonary/Chest: Effort normal and breath sounds normal. She has no wheezes. She has no rales.   Abdominal: Soft. There is no hepatosplenomegaly. There is no tenderness.   Musculoskeletal:        Right shoulder: She exhibits decreased range of motion and tenderness. She exhibits no bony tenderness, no swelling and no deformity.   Right shoulder: There is no tenderness to palpation of the affected shoulder.  There is no erythema, edema, or warmth.  There is no pain with abduction, adduction, external rotation, and internal rotation.  The patient is able to put both hands behind the head, both hands behind the back, and do the crossover maneuver without pain.   Neurological: She has normal strength and normal reflexes.   Skin: Lesion noted. No rash noted.   +small <1cm masses x2 on palmar aspect of left hand located along metacarpal of 4th digit, possible ganglion cyst as along ligament of 4th digit   Psychiatric: She has a normal mood and affect. Her behavior is normal.   Nursing note and vitals reviewed.            Assessment/Plan   Cecille was seen today for arm pain and cyst.    Diagnoses and all orders for this visit:    Acute pain of right shoulder  -     predniSONE (DELTASONE) 20 MG tablet; Take 1 tablet by mouth Daily for 5 days.    Cyst of joint of hand, left    Other orders  -     ELIQUIS 5 MG tablet tablet; Take 1 tablet by mouth 2 (Two) Times a Day.      Cysts of hand consistent with ganglion cyst, although Dupuytren contracture may be considered. Will monitor at this time as pt in no pain and no decreased ROM.   Will trial prednisone for acute inflammation. Advised heated rice sock for additional relief of symptoms. Pt is getting heating pad soon through worker's comp for other previous injuries. Can use this as well.   Advised to discuss shoulder pain with PT for further  interventions.           Return if symptoms worsen or fail to improve.

## 2018-10-08 ENCOUNTER — HOSPITAL ENCOUNTER (OUTPATIENT)
Dept: SPEECH THERAPY | Facility: HOSPITAL | Age: 48
Setting detail: THERAPIES SERIES
Discharge: HOME | End: 2018-10-08

## 2018-10-08 ENCOUNTER — TELEPHONE (OUTPATIENT)
Dept: INTERNAL MEDICINE | Facility: CLINIC | Age: 48
End: 2018-10-08

## 2018-10-08 DIAGNOSIS — R41.841 COGNITIVE COMMUNICATION DEFICIT: Primary | ICD-10-CM

## 2018-10-08 DIAGNOSIS — M25.511 ACUTE PAIN OF RIGHT SHOULDER: Primary | ICD-10-CM

## 2018-10-08 PROCEDURE — G9168 MEMORY CURRENT STATUS: HCPCS | Performed by: SPEECH-LANGUAGE PATHOLOGIST

## 2018-10-08 PROCEDURE — G0515 COGNITIVE SKILLS DEVELOPMENT: HCPCS | Performed by: SPEECH-LANGUAGE PATHOLOGIST

## 2018-10-08 PROCEDURE — G9169 MEMORY GOAL STATUS: HCPCS | Performed by: SPEECH-LANGUAGE PATHOLOGIST

## 2018-10-08 NOTE — TELEPHONE ENCOUNTER
Please call paul and see if they are using either a heating pad or rice sock as discussed at last visit. Any change/ improvement? Make sure she is taking tylenol routinely, at least BID.   I would like to get XR of her shoulder at this time to make sure no other concerns as her pain has persisted. I will put this order in and they can come by anytime this week between 8am-4pm downstairs at radiology. If normal, will write for PT.

## 2018-10-08 NOTE — TELEPHONE ENCOUNTER
----- Message from Akanksha Brewer sent at 10/8/2018  1:29 PM EDT -----  HIRAM [phone]    PT WAS SEEN 2 WEEKS AGO AND HAS TAKEN ALL PREDNISONE IN ARM.  NO IMPROVEMENT IN RIGHT ARM.  WHAT IS HER NEXT STEP?    [facility]

## 2018-10-08 NOTE — THERAPY PROGRESS REPORT/RE-CERT
Outpatient Speech Language Pathology   Adult Speech Language Cognitive Progress Note/Recertification  [facility]     Patient Name: Cecille Quinn  : 1970  MRN: [id number]  Today's Date: 10/8/2018         Visit Date: 10/08/2018   Patient Active Problem List   Diagnosis   • Left-sided low back pain with left-sided sciatica   • Polysubstance abuse (CMS/HCC)          Visit Dx:    ICD-10-CM ICD-9-CM   1. Cognitive communication deficit R41.841 799.52                               SLP OP Goals     Row Name 10/08/18 1300          Goal Type Needed    Goal Type Needed Other Adult Goals  -GARCIA        Subjective Comments    Subjective Comments pt has missed the last 2 sessions due to confusion with schedule times.   -GARCIA        Subjective Pain    Able to rate subjective pain? yes  -GARCIA     Pre-Treatment Pain Level 6  -GARCIA     Post-Treatment Pain Level 6  -GARCIA     Subjective Pain Comment arm pain  -GARCIA        Other Goals    Other Adult Goal- 1 LTG 1: Pt. will demonstrate improvement in cognitive communication with intermittent cues.   -GARCIA     Status: Other Adult Goal- 1 Progressing as expected  -GARCIA     Comments: Other Adult Goal- 1 9/10/2018: pt improving with use of cues. requires more strategies and training.  2018: pt is progressing and has demonstrated improved cognitive communicaiton in the areas of problem solving and reasoning. : Initiated STGs. Performing well with min-mod cues.   -GARCIA     Other Adult Goal- 2 STG 1: Pt. will immediately recall 5 unrelated words with 80% accuracy and intermittent cues.  -GARCIA     Status: Other Adult Goal- 2 Achieved  -GARCIA     Comments: Other Adult Goal- 2  9/10/2018: 100% 2018: 80% no cues. 100% with cues.: Immediately recall 5 unrelated words: 100% with one repetition each. 5 words unrelated 100% immediate recall.: 5 unrelated word recall-80% with min cues to correct errors.  -GARCIA     Other Adult Goal- 3 STG 2: Pt. will recall 3 unrelated words post 5 min with 80%  accuracy and intermittent cues  -GARCIA     Status: Other Adult Goal- 3 Progressing as expected  -GARCIA     Comments: Other Adult Goal- 3 9/17/2018: 2/3-3 min; 2/3 5 min; 1/3-5 min; 1/3 5 min. 9/10/2018:1/3: 2/3: 2/3: requires cues to recall the story added layer of writing down the words. 3/3 after writing down the words for recall later.  8/27/2018: 3 unrelated word recall2/3 without cues; 3/3 with cues; 1/3 without cues; 2/3 with. employed strategies of making a story and writing the words with improved results.  7/30: Recall 3 unrelated words post 5 min: 67% with intermittent cues. 8/14/18; 5 minutes 0/3 without cues; 3/3 with cues; after 1 minute-3/3; after 3 minutes 2/3, 3/3 with cues.8/20/18: 1/3 I, 3/3 with cues. 3/3 ; 1/3; 2/3;   -GARCIA     Other Adult Goal- 4 STG 3: Pt. will recall details from 50-65 wd. paragraphs with 80% accuracy and intermittent cues.   -GARCIA     Status: Other Adult Goal- 4 Progressing as expected  -GARCIA     Comments: Other Adult Goal- 4 9/17/2018: talk path news-2 news stories 6/6.  9/10/2018: 8/27/2018: 9/10; 8/10; 8/10.  7/30: Immediate recall of info from 1 min news: 70% with intermittent cues. 8/14/18: recall from paragraphs 8/20/18: immediate recall of 65word paragraphs given specific questions 100%  -GARCIA     Other Adult Goal- 5 STG 4: Pt. will complete ongoing evaluation for organization, reasoning, and executive functioning.   -GARCIA     Status: Other Adult Goal- 5 Achieved  -GARCIA     Comments: Other Adult Goal- 5 9/17/2018: administered APT test results indicate decreased attention in complex sustained, selective attention divided and alternating attention. 7/23: Completed and new STGs added PRN.  -GARCIA     Other Adult Goal- 6 STG 5: Pt. will complete mod-complex deductive reasoning puzzles with 90% accuracy and intermittent cues.   -GARCIA     Status: Other Adult Goal- 6 Progressing as expected  -GARCIA     Comments: Other Adult Goal- 6 10/08/2018: pt forgot homework today. gave more puzzles for  homework.9/17/2018: gave deductive reasoning puzzles for homework. 9/10/2018: reviewed homework 75% correct. re-did one mind abdi. 8/27/2018: deductive reasioning puzzles homework 100%. pt downloaded apps for reasoning as well on her phone.7/30: Deductive puzzle (2x5 grid): 80% with intermittent cues. 8/14/18: 2x5 grid 80% with intermittant cues. gave more for home practice.8/20/18: pt requires cues for 3x4 grid completion however, improved responses and accuracy.  -GARCIA     Other Adult Goal- 7 Repeat RBANS to assess progress and adjust goals if needed.   -GARCIA     Status: Other Adult Goal- 7 New;Achieved  -GARCIA     Comments: Other Adult Goal- 7 10/08/2018: RBANS complete: immediate memory-99 (83 on 7/9/18); visuospatial 116 (100 on 7/9/18); language-91 (91 on 7/9/18); attention-100; delayed memory-48 (44 on 7/9/18);  total scale-85-(80 on 7/9/18)  -GARCIA     Other Adult Goal- 8 Pt will utilize 3 memory enhancers consistently to recall activities and schedule with min cues.   -GARCIA     Status: Other Adult Goal- 8 New  -GARCIA     Comments: Other Adult Goal- 8 10/08/2018: initiated today with journal and calendar homework. discussed recall deficit and need for patinet to employ strategies for improving it for carryover.   -GARCIA        SLP Time Calculation    SLP Goal Re-Cert Due Date 01/05/19  -GARCIA       User Key  (r) = Recorded By, (t) = Taken By, (c) = Cosigned By    Initials Name Provider Type    Dara Mckeon, MS CCC-SLP Speech and Language Pathologist                OP SLP Education     Row Name 10/08/18 1300       Education    Barriers to Learning No barriers identified  -GARCIA    Education Provided Described results of evaluation;Patient expressed understanding of evaluation;Patient requires further education on strategies, risks;Family/caregivers expressed understanding of evaluation  -GARCIA    Assessed Learning needs;Learning motivation;Learning preferences;Learning readiness  -GARCIA    Learning Motivation Strong  -GARCIA     Learning Method Demonstration;Explanation;Teach back;Written materials  -GARCIA    Teaching Response Verbalized understanding;Demonstrated understanding;Reinforcement needed  -GARCIA    Education Comments ongoing for carryover actvitities and compensatory memory strategies.   -GARCIA      User Key  (r) = Recorded By, (t) = Taken By, (c) = Cosigned By    Initials Name Effective Dates    GARCIA ClarosDara, MS CCC-SLP 08/03/18 -                 OP SLP Assessment/Plan - 10/08/18 1300        SLP Assessment    Functional Problems Speech Language- Adult/Cognition  -GARCIA    Impact on Function: Adult Speech Language/Cognition Unable to complete specified job requirements;Restrictions in personal and social life;Difficulty participating in avocational activities  -GARICA    Clinical Impression: Speech Language-Adult/Congnition Mild:;Cognitive Communication Impairment  -GARCIA    Functional Problems Comment While patient has improved in speech therapy, she continues to have short term memory deficits which affect her follow through with schedule. She requires reminders and strategies to supplement and compensate.   -GARCIA    Clinical Impression Comments pt exhibits mild cognitive deficits with positive results when using strategies to compensate for short term memory. however, she is currently not using them consistently.  -GARCIA    Please refer to paper survey for additional self-reported information Yes  -GARCIA    Please refer to items scanned into chart for additional diagnostic informaiton and handouts as provided by clinician Yes  -GARCIA    Prognosis Good (comment)  -GARCIA    Patient/caregiver participated in establishment of treatment plan and goals Yes  -GARCIA    Patient would benefit from skilled therapy intervention Yes  -GARCIA       SLP Plan    Frequency 1x week  -GARCIA    Duration 4 more weeks  -GARCIA    Planned CPT's? SLP DEVEL COG SKILLS (PER 15 MINUTES): 88328  -GARCIA    Expected Duration Therapy Session - minutes 45-60 minutes  -GARCIA    Plan Comments continue plan  of care with newly added goal for strategies and carryover activities in preparation for discharge.   -GARCIA      User Key  (r) = Recorded By, (t) = Taken By, (c) = Cosigned By    Initials Name Provider Type    Dara Mckeon MS CCC-SLP Speech and Language Pathologist             SLP Outcome Measures (last 72 hours)      SLP Outcome Measures     Row Name 10/08/18 1400             SLP Outcome Measures    Outcome Measure Used? Adult NOMS  -GARCIA         Adult FCM Scores    FCM Chosen Memory  -GARCIA      Memory FCM Score 5  -GARCIA        User Key  (r) = Recorded By, (t) = Taken By, (c) = Cosigned By    Initials Name Effective Dates    Dara Mckeon MS CCC-SLP 08/03/18 -              Time Calculation:   SLP Start Time: 1300    Therapy Charges for Today     Code Description Service Date Service Provider Modifiers Qty    82582093748 HC ST MEMORY CURRENT 10/8/2018 Dara Claros MS CCC-SLP GN, CJ 1    37722957894 HC ST MEMORY PROJECTED 10/8/2018 Dara Claros MS CCC-SLP GN, CI 1    72800399303 HC ST DEV OF COGN SKILLS EACH 15 MIN 10/8/2018 Dara Claros MS CCC-SLP  4          SLP G-Codes  SLP NOMS Used?: Yes  Functional Limitations: Memory  Memory Current Status (): At least 20 percent but less than 40 percent impaired, limited or restricted  Memory Goal Status (): At least 1 percent but less than 20 percent impaired, limited or restricted        Dara Claros MS CCC-SLP  10/8/2018

## 2018-10-08 NOTE — TELEPHONE ENCOUNTER
Called [facility], advised of clinical message. Patient's partner states they will go to the one in [place] tomorrow since that radiology office  is closer to her. Patient's partner expressed good verbal understanding

## 2018-10-10 ENCOUNTER — HOSPITAL ENCOUNTER (OUTPATIENT)
Dept: GENERAL RADIOLOGY | Facility: HOSPITAL | Age: 48
Discharge: HOME | End: 2018-10-10
Admitting: PHYSICIAN ASSISTANT

## 2018-10-10 DIAGNOSIS — M25.511 RIGHT SHOULDER PAIN, UNSPECIFIED CHRONICITY: Primary | ICD-10-CM

## 2018-10-10 DIAGNOSIS — M25.511 ACUTE PAIN OF RIGHT SHOULDER: ICD-10-CM

## 2018-10-10 PROCEDURE — 73030 X-RAY EXAM OF SHOULDER: CPT

## 2018-10-17 ENCOUNTER — HOSPITAL ENCOUNTER (OUTPATIENT)
Dept: SPEECH THERAPY | Facility: HOSPITAL | Age: 48
Setting detail: THERAPIES SERIES
Discharge: HOME | End: 2018-10-17

## 2018-10-17 ENCOUNTER — HOSPITAL ENCOUNTER (OUTPATIENT)
Dept: PHYSICAL THERAPY | Facility: HOSPITAL | Age: 48
Setting detail: THERAPIES SERIES
Discharge: HOME | End: 2018-10-17

## 2018-10-17 DIAGNOSIS — R41.841 COGNITIVE COMMUNICATION DEFICIT: Primary | ICD-10-CM

## 2018-10-17 DIAGNOSIS — M25.511 RIGHT SHOULDER PAIN, UNSPECIFIED CHRONICITY: Primary | ICD-10-CM

## 2018-10-17 PROCEDURE — G0515 COGNITIVE SKILLS DEVELOPMENT: HCPCS | Performed by: SPEECH-LANGUAGE PATHOLOGIST

## 2018-10-17 PROCEDURE — 97161 PT EVAL LOW COMPLEX 20 MIN: CPT | Performed by: PHYSICAL THERAPIST

## 2018-10-17 NOTE — THERAPY TREATMENT NOTE
Outpatient Speech Language Pathology   Adult Speech Language Cognitive Treatment Note  [facility]     Patient Name: Cecille Quinn  : 1970  MRN: [id number]  Today's Date: 10/17/2018         Visit Date: 10/17/2018   Patient Active Problem List   Diagnosis   • Left-sided low back pain with left-sided sciatica   • Polysubstance abuse (CMS/HCC)          Visit Dx:    ICD-10-CM ICD-9-CM   1. Cognitive communication deficit R41.841 799.52                               SLP OP Goals     Row Name 10/17/18 1520 10/17/18 1500       Goal Type Needed    Goal Type Needed Other Adult Goals  -GARCIA  --       Subjective Comments    Subjective Comments pt brought significant other today to therapy for training of memory strategies.  -GARCIA  --       Subjective Pain    Able to rate subjective pain? yes  -GARCIA  --    Pre-Treatment Pain Level 2  -GARCIA  --    Post-Treatment Pain Level 2  -GARCIA  --       Other Goals    Other Adult Goal- 1 LTG 1: Pt. will demonstrate improvement in cognitive communication with intermittent cues.   -GARCIA  --    Status: Other Adult Goal- 1 Progressing as expected  -GARCIA  --    Comments: Other Adult Goal- 1 10/17/2018: reviewed progress with pt and sig other; pt improved, however, requires supervision with some adl type activites. 9/10/2018: pt improving with use of cues. requires more strategies and training.  2018: pt is progressing and has demonstrated improved cognitive communicaiton in the areas of problem solving and reasoning. : Initiated STGs. Performing well with min-mod cues.   -GARCIA  --    Other Adult Goal- 2 STG 1: Pt. will immediately recall 5 unrelated words with 80% accuracy and intermittent cues.  -GARCIA  --    Status: Other Adult Goal- 2 Achieved  -GARCIA  --    Comments: Other Adult Goal- 2  9/10/2018: 100% 2018: 80% no cues. 100% with cues.: Immediately recall 5 unrelated words: 100% with one repetition each. 5 words unrelated 100% immediate recall.: 5 unrelated word recall-80% with  min cues to correct errors.  -GARCIA  --    Other Adult Goal- 3 STG 2: Pt. will recall 3 unrelated words post 5 min with 80% accuracy and intermittent cues  -GARCIA  --    Status: Other Adult Goal- 3 Progressing as expected  -GARCIA  --    Comments: Other Adult Goal- 3 10/17/2018: 3 unrelated words immediate recall 10/10 delayed recall of 3 words 2/3; 2/3 3/3 with cues 9/17/2018: 2/3-3 min; 2/3 5 min; 1/3-5 min; 1/3 5 min. 9/10/2018:1/3: 2/3: 2/3: requires cues to recall the story added layer of writing down the words. 3/3 after writing down the words for recall later.  8/27/2018: 3 unrelated word recall2/3 without cues; 3/3 with cues; 1/3 without cues; 2/3 with. employed strategies of making a story and writing the words with improved results.  7/30: Recall 3 unrelated words post 5 min: 67% with intermittent cues. 8/14/18; 5 minutes 0/3 without cues; 3/3 with cues; after 1 minute-3/3; after 3 minutes 2/3, 3/3 with cues.8/20/18: 1/3 I, 3/3 with cues. 3/3 ; 1/3; 2/3;   -GARCIA  --    Other Adult Goal- 4 STG 3: Pt. will recall details from 50-65 wd. paragraphs with 80% accuracy and intermittent cues.   -GARCIA  --    Status: Other Adult Goal- 4 Progressing as expected  -GARCIA  --    Comments: Other Adult Goal- 4 9/17/2018: talk path news-2 news stories 6/6.  9/10/2018: 8/27/2018: 9/10; 8/10; 8/10.  7/30: Immediate recall of info from 1 min news: 70% with intermittent cues. 8/14/18: recall from paragraphs 8/20/18: immediate recall of 65word paragraphs given specific questions 100%  -GARCIA  --    Other Adult Goal- 5 STG 4: Pt. will complete ongoing evaluation for organization, reasoning, and executive functioning.   -GARCIA  --    Status: Other Adult Goal- 5 Achieved  -GARCIA  --    Comments: Other Adult Goal- 5 9/17/2018: administered APT test results indicate decreased attention in complex sustained, selective attention divided and alternating attention. 7/23: Completed and new STGs added PRN.  -GARCIA  --    Other Adult Goal- 6 STG 5: Pt. will complete  mod-complex deductive reasoning puzzles with 90% accuracy and intermittent cues.   -GARCIA  --    Status: Other Adult Goal- 6 Progressing as expected  -GARCIA  --    Comments: Other Adult Goal- 6 10/17/2018: pt required mod cues to complete one and min cues for second. 10/08/2018: pt forgot homework today. gave more puzzles for homework.9/17/2018: gave deductive reasoning puzzles for homework. 9/10/2018: reviewed homework 75% correct. re-did one mind abdi. 8/27/2018: deductive reasioning puzzles homework 100%. pt downloaded apps for reasoning as well on her phone.7/30: Deductive puzzle (2x5 grid): 80% with intermittent cues. 8/14/18: 2x5 grid 80% with intermittant cues. gave more for home practice.8/20/18: pt requires cues for 3x4 grid completion however, improved responses and accuracy.  -GARCIA  --    Other Adult Goal- 7 Repeat RBANS to assess progress and adjust goals if needed.   -GARCIA  --    Status: Other Adult Goal- 7 New;Achieved  -GARCIA  --    Comments: Other Adult Goal- 7 10/08/2018: RBANS complete: immediate memory-99 (83 on 7/9/18); visuospatial 116 (100 on 7/9/18); language-91 (91 on 7/9/18); attention-100; delayed memory-48 (44 on 7/9/18);  total scale-85-(80 on 7/9/18)  -GARCIA  --    Other Adult Goal- 8 Pt will utilize 3 memory enhancers consistently to recall activities and schedule with min cues.   -GARCIA  --    Status: Other Adult Goal- 8 Progressing as expected;Progress slower than expected  -GARCIA  --    Comments: Other Adult Goal- 8 10/17/2018: reviewed with pt and sig other importance of keeping for recall. 10/08/2018: initiated today with journal and calendar homework. discussed recall deficit and need for patinet to employ strategies for improving it for carryover.   -GARCIA  --       SLP Time Calculation    SLP Goal Re-Cert Due Date 01/05/19  -GARCIA  --       Time Calculation    PT Goal Re-Cert Due Date  -- 01/15/19  -JEANINE      User Key  (r) = Recorded By, (t) = Taken By, (c) = Cosigned By    Initials Name Provider Type     Lan Gonsalez, PT Physical Therapist    Dara Mckeon MS CCC-SLP Speech and Language Pathologist                OP SLP Education     Row Name 10/17/18 1520       Education    Barriers to Learning No barriers identified  -GARCIA    Education Provided Patient requires further education on strategies, risks;Family/caregivers require further education on strategies, risks  -GARCIA    Assessed Learning readiness;Learning preferences;Learning motivation;Learning needs  -    Learning Motivation Moderate;Strong  -GARCIA    Learning Method Explanation;Demonstration;Teach back;Written materials  -GARCIA    Teaching Response Verbalized understanding;Demonstrated understanding;Reinforcement needed  -GARCIA    Education Comments ongoing  -GARCIA      User Key  (r) = Recorded By, (t) = Taken By, (c) = Cosigned By    Initials Name Effective Dates    Dara Mckeon MS CCC-SLP 08/03/18 -                 OP SLP Assessment/Plan - 10/17/18 1520        SLP Assessment    Functional Problems Speech Language- Adult/Cognition  -    Impact on Function: Adult Speech Language/Cognition Difficulty sequencing thoughts to express complex messages;Trouble learning or remembering new information;Unable to complete specified job requirements;Difficulty participating in avocational activities;Restrictions in personal and social life  -GARCIA    Clinical Impression: Speech Language-Adult/Congnition Mild:;Cognitive Communication Impairment  -GARCIA    Prognosis Good (comment)  -GARCIA       SLP Plan    Frequency 1x week  -GARCIA    Duration 3 weeks  -GARCIA    Planned CPT's? SLP DEVEL COG SKILLS (PER 15 MINUTES): 58936  -GARCIA    Expected Duration Therapy Session - minutes 45-60 minutes  -GARCIA    Plan Comments continue plan of care as stated developing home carryover program  -GARCIA      User Key  (r) = Recorded By, (t) = Taken By, (c) = Cosigned By    Initials Name Provider Type    Dara Mckeon MS CCC-SLP Speech and Language Pathologist                 Time Calculation:   SLP  Start Time: 1520  SLP Stop Time: 1605  SLP Time Calculation (min): 45 min    Therapy Charges for Today     Code Description Service Date Service Provider Modifiers Qty    83450287886  ST DEV OF COGN SKILLS EACH 15 MIN 10/17/2018 Daar Claros, MS CCC-SLP  3                   Dara Claros, MS FRANCIA-SLP  10/17/2018
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
## 2018-10-24 ENCOUNTER — TELEPHONE (OUTPATIENT)
Dept: INTERNAL MEDICINE | Facility: CLINIC | Age: 48
End: 2018-10-24

## 2018-10-24 ENCOUNTER — APPOINTMENT (OUTPATIENT)
Dept: PHYSICAL THERAPY | Facility: HOSPITAL | Age: 48
End: 2018-10-24

## 2018-10-24 DIAGNOSIS — M25.511 SHOULDER JOINT PAINFUL ON MOVEMENT, RIGHT: Primary | ICD-10-CM

## 2018-10-24 DIAGNOSIS — M25.511 SEVERE PAIN OF RIGHT SHOULDER: ICD-10-CM

## 2018-10-24 NOTE — TELEPHONE ENCOUNTER
Yes, OK to take ibuprofen at this time. May rotate with tylenol in between ibuprofen doses for better pain control. OK to use sling at home.  Would advise to defer PT until she has had MRI if PT is worsening symptoms at all. If they need to reschedule, make sure they call and cancel/ reschedule to avoid No Shows.

## 2018-10-24 NOTE — TELEPHONE ENCOUNTER
----- Message from Kavita Cole sent at 10/24/2018 12:25 PM EDT -----  PATIENTS SIGNIFICANT OTHER, HIRAM DENIS CALLED ASKING IF A REFERRAL TO ORTHO COULD BE PLACED. SHE STATES THE PATIENT IS UNABLE TO SLEEP DUE TO PAIN. PHYSICAL THERAPY STATED THEY THING SHE HAS A ROTATOR CUFF TEAR.    HIRAM CAN BE REACHED AT: [phone]    THANK YOU.

## 2018-10-24 NOTE — TELEPHONE ENCOUNTER
[phone]  Spoke to Melanie, advised of clinical information. Referrals will contact by phone once approved by insurance, to schedule a date and time for the [place] Location. Good verbal understanding.   Melanie states patient has been taking Ibuprofen 200 mg every 4-6 hours for inflammation, any suggestions for an anti-inflammatory in the mean time or is Ibuprofen ok for now? Melanie inquired if patient is able to use the sling at home, advised that should be ok if it gives the patient some relief for the time being.  Please advise?

## 2018-10-24 NOTE — TELEPHONE ENCOUNTER
Please call Melanie and let her know I have put in order for MRI of R shoulder to assess for rotator cuff tear. Happy to send to ortho if MRI is positive for rotator cuff tear or other abnormality. We will discuss results and ortho referral by phone after MRI.

## 2018-10-29 ENCOUNTER — HOSPITAL ENCOUNTER (OUTPATIENT)
Dept: SPEECH THERAPY | Facility: HOSPITAL | Age: 48
Setting detail: THERAPIES SERIES
Discharge: HOME | End: 2018-10-29

## 2018-10-29 DIAGNOSIS — R41.841 COGNITIVE COMMUNICATION DEFICIT: Primary | ICD-10-CM

## 2018-10-29 PROCEDURE — G0515 COGNITIVE SKILLS DEVELOPMENT: HCPCS | Performed by: SPEECH-LANGUAGE PATHOLOGIST

## 2018-10-29 NOTE — THERAPY TREATMENT NOTE
Outpatient Speech Language Pathology   Adult Speech Language Cognitive Treatment Note  [facility]     Patient Name: Cecille Quinn  : 1970  MRN: [id number]  Today's Date: 10/29/2018         Visit Date: 10/29/2018   Patient Active Problem List   Diagnosis   • Left-sided low back pain with left-sided sciatica   • Polysubstance abuse (CMS/HCC)          Visit Dx:    ICD-10-CM ICD-9-CM   1. Cognitive communication deficit R41.841 799.52                               SLP OP Goals     Row Name 10/29/18 1500          Goal Type Needed    Goal Type Needed Other Adult Goals  -GARCIA        Subjective Pain    Able to rate subjective pain? yes  -GARCIA     Pre-Treatment Pain Level 7  -GARCIA     Post-Treatment Pain Level 7  -GARCIA     Subjective Pain Comment pt has shoulder pain today.  -GARCIA        Other Goals    Other Adult Goal- 1 LTG 1: Pt. will demonstrate improvement in cognitive communication with intermittent cues.   -GARCIA     Status: Other Adult Goal- 1 Progressing as expected  -GARCIA     Comments: Other Adult Goal- 1 10/17/2018: reviewed progress with pt and sig other; pt improved, however, requires supervision with some adl type activites. 9/10/2018: pt improving with use of cues. requires more strategies and training.  2018: pt is progressing and has demonstrated improved cognitive communicaiton in the areas of problem solving and reasoning. : Initiated STGs. Performing well with min-mod cues.   -GARCIA     Other Adult Goal- 2 STG 1: Pt. will immediately recall 5 unrelated words with 80% accuracy and intermittent cues.  -GARCIA     Status: Other Adult Goal- 2 Achieved  -GARCIA     Comments: Other Adult Goal- 2  9/10/2018: 100% 2018: 80% no cues. 100% with cues.: Immediately recall 5 unrelated words: 100% with one repetition each. 5 words unrelated 100% immediate recall.: 5 unrelated word recall-80% with min cues to correct errors.  -GARCIA     Other Adult Goal- 3 STG 2: Pt. will recall 3 unrelated words post 5 min with  80% accuracy and intermittent cues  -GARCIA     Status: Other Adult Goal- 3 Progressing as expected  -GARCIA     Comments: Other Adult Goal- 3 10/29/2018: 5 words after 5 min delay- 2/5; 2/5; 3/5. 10/17/2018: 3 unrelated words immediate recall 10/10 delayed recall of 3 words 2/3; 2/3 3/3 with cues 9/17/2018: 2/3-3 min; 2/3 5 min; 1/3-5 min; 1/3 5 min. 9/10/2018:1/3: 2/3: 2/3: requires cues to recall the story added layer of writing down the words. 3/3 after writing down the words for recall later.  8/27/2018: 3 unrelated word recall2/3 without cues; 3/3 with cues; 1/3 without cues; 2/3 with. employed strategies of making a story and writing the words with improved results.  7/30: Recall 3 unrelated words post 5 min: 67% with intermittent cues. 8/14/18; 5 minutes 0/3 without cues; 3/3 with cues; after 1 minute-3/3; after 3 minutes 2/3, 3/3 with cues.8/20/18: 1/3 I, 3/3 with cues. 3/3 ; 1/3; 2/3;   -GARCIA     Other Adult Goal- 4 STG 3: Pt. will recall details from 50-65 wd. paragraphs with 80% accuracy and intermittent cues.   -GARCIA     Status: Other Adult Goal- 4 Progressing as expected  -GARCIA     Comments: Other Adult Goal- 4 10/29/2018:5/5; 5/5; 4/5; 5/5.  9/17/2018: talk path news-2 news stories 6/6.  9/10/2018: 8/27/2018: 9/10; 8/10; 8/10.  7/30: Immediate recall of info from 1 min news: 70% with intermittent cues. 8/14/18: recall from paragraphs 8/20/18: immediate recall of 65word paragraphs given specific questions 100%  -GARCIA     Other Adult Goal- 5 STG 4: Pt. will complete ongoing evaluation for organization, reasoning, and executive functioning.   -GARCIA     Status: Other Adult Goal- 5 Achieved  -GARCIA     Comments: Other Adult Goal- 5 9/17/2018: administered APT test results indicate decreased attention in complex sustained, selective attention divided and alternating attention. 7/23: Completed and new STGs added PRN.  -GARCIA     Other Adult Goal- 6 STG 5: Pt. will complete mod-complex deductive reasoning puzzles with 90% accuracy  and intermittent cues.   -GARCIA     Status: Other Adult Goal- 6 Progressing as expected  -GARCIA     Comments: Other Adult Goal- 6 10/29/2018: 10/17/2018: pt required mod cues to complete one and min cues for second. 10/08/2018: pt forgot homework today. gave more puzzles for homework.9/17/2018: gave deductive reasoning puzzles for homework. 9/10/2018: reviewed homework 75% correct. re-did one mind abdi. 8/27/2018: deductive reasioning puzzles homework 100%. pt downloaded apps for reasoning as well on her phone.7/30: Deductive puzzle (2x5 grid): 80% with intermittent cues. 8/14/18: 2x5 grid 80% with intermittant cues. gave more for home practice.8/20/18: pt requires cues for 3x4 grid completion however, improved responses and accuracy.  -GARCIA     Other Adult Goal- 7 Repeat RBANS to assess progress and adjust goals if needed.   -GARCIA     Status: Other Adult Goal- 7 New;Achieved  -GARCIA     Comments: Other Adult Goal- 7 10/08/2018: RBANS complete: immediate memory-99 (83 on 7/9/18); visuospatial 116 (100 on 7/9/18); language-91 (91 on 7/9/18); attention-100; delayed memory-48 (44 on 7/9/18);  total scale-85-(80 on 7/9/18)  -GARCIA     Other Adult Goal- 8 Pt will utilize 3 memory enhancers consistently to recall activities and schedule with min cues.   -GARCIA     Status: Other Adult Goal- 8 Progressing as expected;Progress slower than expected  -GARCIA     Comments: Other Adult Goal- 8 10/29/2018: pt using inconsistently. 10/10/17/2018: reviewed with pt and sig other importance of keeping for recall. 10/08/2018: initiated today with journal and calendar homework. discussed recall deficit and need for patinet to employ strategies for improving it for carryover.   -GARCIA        SLP Time Calculation    SLP Goal Re-Cert Due Date 01/05/19  -GARCIA       User Key  (r) = Recorded By, (t) = Taken By, (c) = Cosigned By    Initials Name Provider Type    Dara Mckeon, MS CCC-SLP Speech and Language Pathologist                OP SLP Education     Row  Name 10/29/18 1528       Education    Barriers to Learning No barriers identified  -GARCIA    Education Provided Patient requires further education on strategies, risks  -GARCIA    Assessed Learning readiness;Learning preferences;Learning motivation;Learning needs  -GARCIA    Learning Motivation Strong  -GARCIA    Learning Method Explanation;Demonstration;Teach back;Written materials  -GARCIA    Teaching Response Verbalized understanding;Demonstrated understanding;Reinforcement needed  -GARCIA    Education Comments ongoing for memory strategies.   -GARCIA      User Key  (r) = Recorded By, (t) = Taken By, (c) = Cosigned By    Initials Name Effective Dates    Dara Mckeon MS CCC-SLP 08/03/18 -                 OP SLP Assessment/Plan - 10/29/18 1500        SLP Assessment    Functional Problems Speech Language- Adult/Cognition  -GARCIA    Impact on Function: Adult Speech Language/Cognition Poor attention to task;Decreased recall of personal information and medical history;Trouble learning or remembering new information  -GARCIA    Clinical Impression: Speech Language-Adult/Congnition Mild:;Cognitive Communication Impairment  -GARCIA    Please refer to paper survey for additional self-reported information Yes  -GARCIA    Please refer to items scanned into chart for additional diagnostic informaiton and handouts as provided by clinician Yes  -GARCIA    Prognosis Good (comment)  -GARCIA    Patient/caregiver participated in establishment of treatment plan and goals Yes  -GARCIA    Patient would benefit from skilled therapy intervention Yes  -GARCIA       SLP Plan    Frequency 1x week  -GARCIA    Duration 2 weeks  -GARCIA    Planned CPT's? SLP DEVEL COG SKILLS (PER 15 MINUTES): 81263  -GARCIA    Expected Duration Therapy Session - minutes 45-60 minutes  -GARCIA    Plan Comments continue plan of care as stated.  -GARCIA      User Key  (r) = Recorded By, (t) = Taken By, (c) = Cosigned By    Initials Name Provider Type    Dara Mckeon MS CCC-SLP Speech and Language Pathologist                  Time Calculation:   SLP Start Time: 1500    Therapy Charges for Today     Code Description Service Date Service Provider Modifiers Qty    [id number] HC ST DEV OF COGN SKILLS EACH 15 MIN 10/29/2018 Dara Claros, MS CCC-SLP  4                   Dara Claros MS CCC-SLP  10/29/2018

## 2018-10-29 NOTE — TELEPHONE ENCOUNTER
Partner Melanie calling to check on status of MRI. Referral says availability pending. Can we follow up on this?

## 2018-10-30 ENCOUNTER — HOSPITAL ENCOUNTER (OUTPATIENT)
Dept: MRI IMAGING | Facility: HOSPITAL | Age: 48
Discharge: HOME | End: 2018-10-30
Admitting: PHYSICIAN ASSISTANT

## 2018-10-30 DIAGNOSIS — M19.011 OSTEOARTHRITIS OF RIGHT SHOULDER, UNSPECIFIED OSTEOARTHRITIS TYPE: ICD-10-CM

## 2018-10-30 DIAGNOSIS — M25.511 SHOULDER JOINT PAINFUL ON MOVEMENT, RIGHT: ICD-10-CM

## 2018-10-30 DIAGNOSIS — S46.811A TEAR OF INFRASPINATUS TENDON, RIGHT, INITIAL ENCOUNTER: ICD-10-CM

## 2018-10-30 DIAGNOSIS — M25.511 SEVERE PAIN OF RIGHT SHOULDER: ICD-10-CM

## 2018-10-30 DIAGNOSIS — IMO0001 TEAR OF RIGHT SUPRASPINATUS TENDON, INITIAL ENCOUNTER: Primary | ICD-10-CM

## 2018-10-30 PROCEDURE — 73221 MRI JOINT UPR EXTREM W/O DYE: CPT

## 2018-10-31 ENCOUNTER — TELEPHONE (OUTPATIENT)
Dept: INTERNAL MEDICINE | Facility: CLINIC | Age: 48
End: 2018-10-31

## 2018-10-31 ENCOUNTER — APPOINTMENT (OUTPATIENT)
Dept: MRI IMAGING | Facility: HOSPITAL | Age: 48
End: 2018-10-31

## 2018-10-31 PROBLEM — B18.2 CHRONIC HEPATITIS C WITHOUT HEPATIC COMA (HCC): Status: ACTIVE | Noted: 2018-10-31

## 2018-10-31 NOTE — TELEPHONE ENCOUNTER
Please call pt.  Results discussed with partner eMlanie, and she was informed of referral to orthopedic surgery.     At this time, I would like pt to continue Ibuprofen for pain relief. May take Aleve instead if this gives her more relief, but do not take Aleve and Ibuprofen at the same time. I would like her to see the ortho MD before further medications added.

## 2018-10-31 NOTE — TELEPHONE ENCOUNTER
Spoke with patient, advised of clinical radiology results, information regarding ortho referral. Good verbal understanding.

## 2018-11-01 ENCOUNTER — APPOINTMENT (OUTPATIENT)
Dept: PHYSICAL THERAPY | Facility: HOSPITAL | Age: 48
End: 2018-11-01

## 2018-11-05 ENCOUNTER — OFFICE VISIT (OUTPATIENT)
Dept: ORTHOPEDIC SURGERY | Facility: CLINIC | Age: 48
End: 2018-11-05

## 2018-11-05 VITALS — BODY MASS INDEX: 22.32 KG/M2 | WEIGHT: 142.2 LBS | HEIGHT: 67 IN | OXYGEN SATURATION: 98 % | HEART RATE: 84 BPM

## 2018-11-05 DIAGNOSIS — M75.111 INCOMPLETE TEAR OF RIGHT ROTATOR CUFF: ICD-10-CM

## 2018-11-05 DIAGNOSIS — M25.511 ACUTE PAIN OF RIGHT SHOULDER: Primary | ICD-10-CM

## 2018-11-05 PROCEDURE — 20610 DRAIN/INJ JOINT/BURSA W/O US: CPT | Performed by: PHYSICIAN ASSISTANT

## 2018-11-05 PROCEDURE — 99214 OFFICE O/P EST MOD 30 MIN: CPT | Performed by: PHYSICIAN ASSISTANT

## 2018-11-05 RX ORDER — BUPIVACAINE HYDROCHLORIDE 2.5 MG/ML
2 INJECTION, SOLUTION INFILTRATION; PERINEURAL
Status: COMPLETED | OUTPATIENT
Start: 2018-11-05 | End: 2018-11-05

## 2018-11-05 RX ORDER — IBUPROFEN 200 MG
200 TABLET ORAL EVERY 6 HOURS PRN
COMMUNITY
End: 2018-11-05

## 2018-11-05 RX ORDER — TRIAMCINOLONE ACETONIDE 40 MG/ML
40 INJECTION, SUSPENSION INTRA-ARTICULAR; INTRAMUSCULAR
Status: COMPLETED | OUTPATIENT
Start: 2018-11-05 | End: 2018-11-05

## 2018-11-05 RX ORDER — MELOXICAM 15 MG/1
TABLET ORAL
Qty: 90 TABLET | Refills: 0 | Status: SHIPPED | OUTPATIENT
Start: 2018-11-05 | End: 2019-03-11

## 2018-11-05 RX ADMIN — BUPIVACAINE HYDROCHLORIDE 2 ML: 2.5 INJECTION, SOLUTION INFILTRATION; PERINEURAL at 10:42

## 2018-11-05 RX ADMIN — TRIAMCINOLONE ACETONIDE 40 MG: 40 INJECTION, SUSPENSION INTRA-ARTICULAR; INTRAMUSCULAR at 10:42

## 2018-11-05 NOTE — PROGRESS NOTES
Procedure   Large Joint Arthrocentesis  Date/Time: 11/5/2018 10:42 AM  Consent given by: patient  Site marked: site marked  Timeout: Immediately prior to procedure a time out was called to verify the correct patient, procedure, equipment, support staff and site/side marked as required   Supporting Documentation  Indications: pain   Procedure Details  Location: shoulder - R subacromial bursa  Approach: posterior  Medications administered: 2 mL bupivacaine 0.25 %; 40 mg triamcinolone acetonide 40 MG/ML (2cc  1% Lidocaine NDC [id number]  [id number]  [id number])  Patient tolerance: patient tolerated the procedure well with no immediate complications
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
## 2018-11-07 ENCOUNTER — HOSPITAL ENCOUNTER (OUTPATIENT)
Dept: SPEECH THERAPY | Facility: HOSPITAL | Age: 48
Setting detail: THERAPIES SERIES
Discharge: HOME | End: 2018-11-07

## 2018-11-07 DIAGNOSIS — R41.841 COGNITIVE COMMUNICATION DEFICIT: Primary | ICD-10-CM

## 2018-11-07 PROCEDURE — G9168 MEMORY CURRENT STATUS: HCPCS | Performed by: SPEECH-LANGUAGE PATHOLOGIST

## 2018-11-07 PROCEDURE — G9169 MEMORY GOAL STATUS: HCPCS | Performed by: SPEECH-LANGUAGE PATHOLOGIST

## 2018-11-07 PROCEDURE — G0515 COGNITIVE SKILLS DEVELOPMENT: HCPCS | Performed by: SPEECH-LANGUAGE PATHOLOGIST

## 2018-11-07 PROCEDURE — G9170 MEMORY D/C STATUS: HCPCS | Performed by: SPEECH-LANGUAGE PATHOLOGIST

## 2018-11-07 NOTE — THERAPY DISCHARGE NOTE
Outpatient Speech Language Pathology   Adult Speech Language Cognitive Treatment Note/Discharge Summary   [place]     Patient Name: Cecille Quinn  : 1970  MRN: [id number]  Today's Date: 2018         Visit Date: 2018   Patient Active Problem List   Diagnosis   • Left-sided low back pain with left-sided sciatica   • Polysubstance abuse (CMS/HCC)   • Chronic hepatitis C without hepatic coma (CMS/HCC)          Visit Dx:    ICD-10-CM ICD-9-CM   1. Cognitive communication deficit R41.841 799.52                             SLP OP Goals     Row Name 18 1000          Goal Type Needed    Goal Type Needed Other Adult Goals  -GARCIA        Subjective Comments    Subjective Comments pt continues to be in pain with her shoulder, under Dr. care for this. This pain has distracted her from her cognitive goals.  -GARCIA        Subjective Pain    Able to rate subjective pain? yes  -GARCIA     Pre-Treatment Pain Level 4  -GARCIA     Post-Treatment Pain Level 4  -GARCIA        Other Goals    Other Adult Goal- 1 LTG 1: Pt. will demonstrate improvement in cognitive communication with intermittent cues.   -GARCIA     Status: Other Adult Goal- 1 Progressing as expected;Discontinued  -GARCIA     Comments: Other Adult Goal- 1 2018: pt has plateaued with her cognitive communication skills; 10/17/2018: reviewed progress with pt and sig other; pt improved, however, requires supervision with some adl type activites. 9/10/2018: pt improving with use of cues. requires more strategies and training.  2018: pt is progressing and has demonstrated improved cognitive communicaiton in the areas of problem solving and reasoning. : Initiated STGs. Performing well with min-mod cues.   -GARCIA     Other Adult Goal- 2 STG 1: Pt. will immediately recall 5 unrelated words with 80% accuracy and intermittent cues.  -GARCIA     Status: Other Adult Goal- 2 Achieved  -GARCIA     Comments: Other Adult Goal- 2  9/10/2018: 100% 2018: 80% no cues. 100% with  cues.7/30: Immediately recall 5 unrelated words: 100% with one repetition each. 5 words unrelated 100% immediate recall.8/20: 5 unrelated word recall-80% with min cues to correct errors.  -GARCIA     Other Adult Goal- 3 STG 2: Pt. will recall 3 unrelated words post 5 min with 80% accuracy and intermittent cues  -GARCIA     Status: Other Adult Goal- 3 Achieved  -GARCIA     Comments: Other Adult Goal- 3 11/07/2018: 3/3; 3/3; 3/3. 10/29/2018: 5 words after 5 min delay- 2/5; 2/5; 3/5. 10/17/2018: 3 unrelated words immediate recall 10/10 delayed recall of 3 words 2/3; 2/3 3/3 with cues 9/17/2018: 2/3-3 min; 2/3 5 min; 1/3-5 min; 1/3 5 min. 9/10/2018:1/3: 2/3: 2/3: requires cues to recall the story added layer of writing down the words. 3/3 after writing down the words for recall later.  8/27/2018: 3 unrelated word recall2/3 without cues; 3/3 with cues; 1/3 without cues; 2/3 with. employed strategies of making a story and writing the words with improved results.  7/30: Recall 3 unrelated words post 5 min: 67% with intermittent cues. 8/14/18; 5 minutes 0/3 without cues; 3/3 with cues; after 1 minute-3/3; after 3 minutes 2/3, 3/3 with cues.8/20/18: 1/3 I, 3/3 with cues. 3/3 ; 1/3; 2/3;   -GARCIA     Other Adult Goal- 4 STG 3: Pt. will recall details from 50-65 wd. paragraphs with 80% accuracy and intermittent cues.   -GARCIA     Status: Other Adult Goal- 4 Achieved  -GARCIA     Comments: Other Adult Goal- 4 10/29/2018:5/5; 5/5; 4/5; 5/5.  9/17/2018: talk path news-2 news stories 6/6.  9/10/2018: 8/27/2018: 9/10; 8/10; 8/10.  7/30: Immediate recall of info from 1 min news: 70% with intermittent cues. 8/14/18: recall from paragraphs 8/20/18: immediate recall of 65word paragraphs given specific questions 100%  -GARCIA     Other Adult Goal- 5 STG 4: Pt. will complete ongoing evaluation for organization, reasoning, and executive functioning.   -GARCIA     Status: Other Adult Goal- 5 Achieved  -GARCIA     Comments: Other Adult Goal- 5 9/17/2018: administered APT  test results indicate decreased attention in complex sustained, selective attention divided and alternating attention. 7/23: Completed and new STGs added PRN.  -GARCIA     Other Adult Goal- 6 STG 5: Pt. will complete mod-complex deductive reasoning puzzles with 90% accuracy and intermittent cues.   -GARCIA     Status: Other Adult Goal- 6 Progress slower than expected;Discontinued  -GARCIA     Comments: Other Adult Goal- 6 11/07/2018:  pt requires cues to complete puzzles.10/29/2018: 10/17/2018: pt required mod cues to complete one and min cues for second. 10/08/2018: pt forgot homework today. gave more puzzles for homework.9/17/2018: gave deductive reasoning puzzles for homework. 9/10/2018: reviewed homework 75% correct. re-did one mind abdi. 8/27/2018: deductive reasioning puzzles homework 100%. pt downloaded apps for reasoning as well on her phone.7/30: Deductive puzzle (2x5 grid): 80% with intermittent cues. 8/14/18: 2x5 grid 80% with intermittant cues. gave more for home practice.8/20/18: pt requires cues for 3x4 grid completion however, improved responses and accuracy.  -GARCIA     Other Adult Goal- 7 Repeat RBANS to assess progress and adjust goals if needed.   -GARCIA     Status: Other Adult Goal- 7 New;Achieved  -GARCIA     Comments: Other Adult Goal- 7 10/08/2018: RBANS complete: immediate memory-99 (83 on 7/9/18); visuospatial 116 (100 on 7/9/18); language-91 (91 on 7/9/18); attention-100; delayed memory-48 (44 on 7/9/18);  total scale-85-(80 on 7/9/18)  -GARCIA     Other Adult Goal- 8 Pt will utilize 3 memory enhancers consistently to recall activities and schedule with min cues.   -GARCIA     Status: Other Adult Goal- 8 Progressing as expected;Progress slower than expected;Discontinued  -GARCIA     Comments: Other Adult Goal- 8 11/07/2018: calendar; not using memory journal consistently; pt and significant other have list of memory enhancers. 10/29/2018: pt using inconsistently. 10/10/17/2018: reviewed with pt and sig other importance of  keeping for recall. 10/08/2018: initiated today with journal and calendar homework. discussed recall deficit and need for patinet to employ strategies for improving it for carryover.   -GARCIA        SLP Time Calculation    SLP Goal Re-Cert Due Date 01/05/19  -GARCIA       User Key  (r) = Recorded By, (t) = Taken By, (c) = Cosigned By    Initials Name Provider Type    Dara Mckeon MS CCC-SLP Speech and Language Pathologist                OP SLP Education     Row Name 11/07/18 1000       Education    Barriers to Learning No barriers identified  -GARCIA    Assessed Learning needs;Learning motivation;Learning preferences;Learning readiness  -GARCIA    Learning Motivation Moderate  -GARCIA    Learning Method Teach back;Written materials;Demonstration;Explanation  -GARCIA    Teaching Response Verbalized understanding;Demonstrated understanding  -GARCIA    Education Comments pt given packet of home activities for cognition along with strategies and memory tips for increasing independence  -GARCIA      User Key  (r) = Recorded By, (t) = Taken By, (c) = Cosigned By    Initials Name Effective Dates    Dara Mckeon MS CCC-SLP 08/03/18 -                 OP SLP Assessment/Plan - 11/07/18 1000        SLP Assessment    Functional Problems Speech Language- Adult/Cognition  -    Impact on Function: Adult Speech Language/Cognition Trouble learning or remembering new information;Difficulty participating in avocational activities;Restrictions in personal and social life;Lack of insight or awareness of deficits, safety issues;Poor attention to task  -GARCIA    Clinical Impression: Speech Language-Adult/Congnition Mild:;Cognitive Communication Impairment  -GARCIA    Functional Problems Comment pt continues to requires some assistance with recall for safety. when she uses strategies her memory is improved.   -GARCIA    Clinical Impression Comments pt continues to exhibit mild cognitive deficits that affect her independence with daily living skills and work tasks.    -GARCIA    Please refer to paper survey for additional self-reported information Yes  -GARCIA    Please refer to items scanned into chart for additional diagnostic informaiton and handouts as provided by clinician Yes  -GARCIA    Prognosis Good (comment)  -GARCIA    Patient/caregiver participated in establishment of treatment plan and goals Yes  -GARCIA       SLP Plan    Planned CPT's? SLP DEVEL COG SKILLS (PER 15 MINUTES): 24503  -GARCIA    Expected Duration Therapy Session - minutes 45-60 minutes  -GARCIA    Plan Comments Discontinue skilled therapy with carryover program in place. pt has met some goals and plateaued with others at this time.   -GARCIA      User Key  (r) = Recorded By, (t) = Taken By, (c) = Cosigned By    Initials Name Provider Type    Dara Mckeon MS CCC-SLP Speech and Language Pathologist             SLP Outcome Measures (last 72 hours)      SLP Outcome Measures     Row Name 11/07/18 1000             SLP Outcome Measures    Outcome Measure Used? Adult NOMS  -GARCIA         Adult FCM Scores    FCM Chosen Memory  -GARCIA      Memory FCM Score 5  -GARCIA        User Key  (r) = Recorded By, (t) = Taken By, (c) = Cosigned By    Initials Name Effective Dates    Dara Mckeon MS CCC-SLP 08/03/18 -                Time Calculation:   SLP Start Time: 1000    Therapy Charges for Today     Code Description Service Date Service Provider Modifiers Qty    85563847962 HC ST MEMORY CURRENT 11/7/2018 Dara Claros MS CCC-SLP HARIKA KELLY 1    57114586092 HC ST MEMORY PROJECTED 11/7/2018 Dara Claros MS CCC-SLP HARIKA KELLY 1    95775481015 HC ST MEMORY DISCHARGE 11/7/2018 Dara Claros MS CCC-SLP HARIKA KELLY 1    36105353069 HC ST DEV OF COGN SKILLS EACH 15 MIN 11/7/2018 Dara Claros MS CCC-SLP  4          SLP G-Codes  SLP NOMS Used?: Yes  Functional Limitations: Memory  Memory Current Status (): At least 20 percent but less than 40 percent impaired, limited or restricted  Memory Goal Status (): At least 20 percent but less than 40  percent impaired, limited or restricted  Memory Discharge Status (): At least 20 percent but less than 40 percent impaired, limited or restricted      OP SLP Discharge Summary  Date of Discharge: 11/07/18  Reason for Discharge: identified goals partially met  Progress Toward Achieving Short/long Term Goals: goals partially met within established timelines, other (see comments) (pt has plateaued in therapy at this time. )  Discharge Destination: home w/ assist  Discharge Instructions: home program in place for continued practice and strategies for enhancing memory skills.       Dara Claros MS CCC-SLP  11/7/2018

## 2018-11-12 ENCOUNTER — APPOINTMENT (OUTPATIENT)
Dept: PHYSICAL THERAPY | Facility: HOSPITAL | Age: 48
End: 2018-11-12

## 2018-11-13 ENCOUNTER — APPOINTMENT (OUTPATIENT)
Dept: PHYSICAL THERAPY | Facility: HOSPITAL | Age: 48
End: 2018-11-13

## 2018-11-20 ENCOUNTER — HOSPITAL ENCOUNTER (OUTPATIENT)
Dept: PHYSICAL THERAPY | Facility: HOSPITAL | Age: 48
Setting detail: THERAPIES SERIES
Discharge: HOME | End: 2018-11-20

## 2018-11-20 DIAGNOSIS — M54.42 ACUTE LEFT-SIDED LOW BACK PAIN WITH LEFT-SIDED SCIATICA: ICD-10-CM

## 2018-11-20 DIAGNOSIS — M25.511 RIGHT SHOULDER PAIN, UNSPECIFIED CHRONICITY: Primary | ICD-10-CM

## 2018-11-20 PROCEDURE — 97110 THERAPEUTIC EXERCISES: CPT | Performed by: PHYSICAL THERAPIST

## 2018-11-20 PROCEDURE — 97140 MANUAL THERAPY 1/> REGIONS: CPT | Performed by: PHYSICAL THERAPIST
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
## 2018-11-26 ENCOUNTER — HOSPITAL ENCOUNTER (OUTPATIENT)
Dept: PHYSICAL THERAPY | Facility: HOSPITAL | Age: 48
Setting detail: THERAPIES SERIES
Discharge: HOME | End: 2018-11-26

## 2018-11-26 DIAGNOSIS — M25.511 RIGHT SHOULDER PAIN, UNSPECIFIED CHRONICITY: Primary | ICD-10-CM

## 2018-11-26 DIAGNOSIS — M54.42 ACUTE LEFT-SIDED LOW BACK PAIN WITH LEFT-SIDED SCIATICA: ICD-10-CM

## 2018-11-26 PROCEDURE — 97140 MANUAL THERAPY 1/> REGIONS: CPT | Performed by: PHYSICAL THERAPIST

## 2018-11-26 PROCEDURE — 97110 THERAPEUTIC EXERCISES: CPT | Performed by: PHYSICAL THERAPIST
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
## 2018-11-29 ENCOUNTER — HOSPITAL ENCOUNTER (OUTPATIENT)
Dept: PHYSICAL THERAPY | Facility: HOSPITAL | Age: 48
Setting detail: THERAPIES SERIES
Discharge: HOME | End: 2018-11-29

## 2018-11-29 DIAGNOSIS — M54.42 ACUTE LEFT-SIDED LOW BACK PAIN WITH LEFT-SIDED SCIATICA: ICD-10-CM

## 2018-11-29 DIAGNOSIS — M25.511 RIGHT SHOULDER PAIN, UNSPECIFIED CHRONICITY: Primary | ICD-10-CM

## 2018-11-29 PROCEDURE — 97140 MANUAL THERAPY 1/> REGIONS: CPT | Performed by: PHYSICAL THERAPIST

## 2018-11-29 PROCEDURE — 97110 THERAPEUTIC EXERCISES: CPT | Performed by: PHYSICAL THERAPIST
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
## 2018-12-03 ENCOUNTER — OFFICE VISIT (OUTPATIENT)
Dept: ORTHOPEDIC SURGERY | Facility: CLINIC | Age: 48
End: 2018-12-03

## 2018-12-03 VITALS — HEIGHT: 67 IN | WEIGHT: 142.2 LBS | OXYGEN SATURATION: 98 % | HEART RATE: 90 BPM | BODY MASS INDEX: 22.32 KG/M2

## 2018-12-03 DIAGNOSIS — M75.111 INCOMPLETE TEAR OF RIGHT ROTATOR CUFF: ICD-10-CM

## 2018-12-03 DIAGNOSIS — M25.511 ACUTE PAIN OF RIGHT SHOULDER: Primary | ICD-10-CM

## 2018-12-03 PROCEDURE — 99212 OFFICE O/P EST SF 10 MIN: CPT | Performed by: PHYSICIAN ASSISTANT

## 2018-12-03 NOTE — PROGRESS NOTES
"    [facility] Note    Subjective     CC: Follow-up of the Right Shoulder (4 weeks)      HPI    Cecille Quinn is a 48 y.o. female.  Patient returns for follow-up evaluation of her right shoulder.  States she is unsure if the injection (subacromial corticosteroid injection provided 11/5/18) helped all but notes that the physical therapy has improved her range of motion and pain control.  She continues to endorse pain scale 6/10.  Severity the pain moderate.  Quality pain aching.  Associated symptoms stiffness.  No reported radiculopathy or paresthesias.       ROS:    Constiutional:Pt denies fever, chills, nausea, or vomiting.  MSK:as above    Objective      Past Medical History  Past Medical History:   Diagnosis Date   • Arthritis    • Atrial fibrillation (CMS/HCC)    • Depression    • H/O blood clots          Physical Exam  Pulse 90   Ht 170.2 cm (67.01\")   Wt 64.5 kg (142 lb 3.2 oz)   SpO2 98%   BMI 22.27 kg/m²     Body mass index is 22.27 kg/m².    Patient is well nourished and well developed.        Ortho Exam  Peripheral Vascular   Right Upper Extremity    No cyanotic nail beds    Pink nail beds and rapid capillary refill    Palpation    Radial Pulse - Bilaterally normal    Musculoskeletal   Upper Extremity   Right Shoulder     Inspection and Palpation: Still with global tenderness throughout shoulder particular to the posterior lateral aspect of the shoulder but much improved since last visit.    Sensation is normal     Strength and Tone:    Supraspinatus - 4-/5    Infraspinatus - 4-/5    Biceps - 5/5    Subscapularis-4/5      AC joint tenderness:  Mild discomfort     Range of Motion   Right Shoulder:    Internal Rotation: ROM - back hip pocket    External Rotation: AROM - 45 degrees    Elevation through flexion: AROM - 120 degrees      Impingement   Right shoulder    Vail-Charli impingement test positive    Neer impingement test positive   Functional Testing   Right shoulder      "                     AC crossover adduction test positive                          Abdominal compression test positive                          Lift-off sign positive                          Speed's test positive                          Ford's test positive    All testing of the shoulder still increases her pain but improved since last visit.    Imaging/Labs/EMG Reviewed:  Imaging Results (last 24 hours)     ** No results found for the last 24 hours. **      None today.    Assessment:  1. Acute pain of right shoulder    2. Incomplete tear of right rotator cuff        Plan:  1. Discussion was had with patient regarding exam, assessment and treatment options.  2. She was offered a repeat corticosteroid injection but she declined.  3. She'll continue working with physical therapy.  4. Recommend she continue with Mobic and Tylenol for pain control.   5. Follow-up in 6 weeks for repeat evaluation, sooner if she has a flare for pain and wishes to have repeat steroid injection.  6. Question and concerns answered.      Patient was examined by Dr. Antoine and he agrees with the above assessment and plan.      Medical Decision Making  Management Options : prescription/IM medicine and physical/occupational therapy      Clarissa Dao PA-C  12/04/18  1:07 PM

## 2018-12-04 ENCOUNTER — HOSPITAL ENCOUNTER (OUTPATIENT)
Dept: PHYSICAL THERAPY | Facility: HOSPITAL | Age: 48
Setting detail: THERAPIES SERIES
Discharge: HOME | End: 2018-12-04

## 2018-12-04 DIAGNOSIS — M54.42 ACUTE LEFT-SIDED LOW BACK PAIN WITH LEFT-SIDED SCIATICA: ICD-10-CM

## 2018-12-04 DIAGNOSIS — M25.511 RIGHT SHOULDER PAIN, UNSPECIFIED CHRONICITY: Primary | ICD-10-CM

## 2018-12-04 PROCEDURE — 97140 MANUAL THERAPY 1/> REGIONS: CPT | Performed by: PHYSICAL THERAPIST

## 2018-12-04 PROCEDURE — 97110 THERAPEUTIC EXERCISES: CPT | Performed by: PHYSICAL THERAPIST
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
## 2018-12-07 ENCOUNTER — HOSPITAL ENCOUNTER (OUTPATIENT)
Dept: PHYSICAL THERAPY | Facility: HOSPITAL | Age: 48
Setting detail: THERAPIES SERIES
Discharge: HOME | End: 2018-12-07

## 2018-12-07 DIAGNOSIS — M54.42 ACUTE LEFT-SIDED LOW BACK PAIN WITH LEFT-SIDED SCIATICA: ICD-10-CM

## 2018-12-07 DIAGNOSIS — M25.511 RIGHT SHOULDER PAIN, UNSPECIFIED CHRONICITY: Primary | ICD-10-CM

## 2018-12-07 PROCEDURE — 97140 MANUAL THERAPY 1/> REGIONS: CPT | Performed by: PHYSICAL THERAPIST

## 2018-12-07 PROCEDURE — 97110 THERAPEUTIC EXERCISES: CPT | Performed by: PHYSICAL THERAPIST
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
## 2018-12-14 ENCOUNTER — HOSPITAL ENCOUNTER (OUTPATIENT)
Dept: PHYSICAL THERAPY | Facility: HOSPITAL | Age: 48
Setting detail: THERAPIES SERIES
Discharge: HOME | End: 2018-12-14

## 2018-12-14 DIAGNOSIS — M25.511 RIGHT SHOULDER PAIN, UNSPECIFIED CHRONICITY: Primary | ICD-10-CM

## 2018-12-14 DIAGNOSIS — M54.42 ACUTE LEFT-SIDED LOW BACK PAIN WITH LEFT-SIDED SCIATICA: ICD-10-CM

## 2018-12-14 PROCEDURE — 97110 THERAPEUTIC EXERCISES: CPT | Performed by: PHYSICAL THERAPIST

## 2018-12-14 PROCEDURE — 97140 MANUAL THERAPY 1/> REGIONS: CPT | Performed by: PHYSICAL THERAPIST
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
## 2018-12-21 ENCOUNTER — HOSPITAL ENCOUNTER (OUTPATIENT)
Dept: PHYSICAL THERAPY | Facility: HOSPITAL | Age: 48
Setting detail: THERAPIES SERIES
Discharge: HOME | End: 2018-12-21

## 2018-12-21 DIAGNOSIS — M25.511 RIGHT SHOULDER PAIN, UNSPECIFIED CHRONICITY: Primary | ICD-10-CM

## 2018-12-21 PROCEDURE — 97110 THERAPEUTIC EXERCISES: CPT | Performed by: PHYSICAL THERAPIST
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
## 2018-12-27 ENCOUNTER — HOSPITAL ENCOUNTER (OUTPATIENT)
Dept: PHYSICAL THERAPY | Facility: HOSPITAL | Age: 48
Setting detail: THERAPIES SERIES
Discharge: HOME | End: 2018-12-27

## 2018-12-27 DIAGNOSIS — M25.511 RIGHT SHOULDER PAIN, UNSPECIFIED CHRONICITY: Primary | ICD-10-CM

## 2018-12-27 DIAGNOSIS — M54.42 ACUTE LEFT-SIDED LOW BACK PAIN WITH LEFT-SIDED SCIATICA: ICD-10-CM

## 2018-12-27 PROCEDURE — 97110 THERAPEUTIC EXERCISES: CPT | Performed by: PHYSICAL THERAPIST

## 2018-12-27 PROCEDURE — 97140 MANUAL THERAPY 1/> REGIONS: CPT | Performed by: PHYSICAL THERAPIST
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
## 2019-01-14 ENCOUNTER — OFFICE VISIT (OUTPATIENT)
Dept: ORTHOPEDIC SURGERY | Facility: CLINIC | Age: 49
End: 2019-01-14

## 2019-01-14 VITALS — HEART RATE: 90 BPM | HEIGHT: 67 IN | BODY MASS INDEX: 23.32 KG/M2 | OXYGEN SATURATION: 98 % | WEIGHT: 148.59 LBS

## 2019-01-14 DIAGNOSIS — M75.111 INCOMPLETE TEAR OF RIGHT ROTATOR CUFF: ICD-10-CM

## 2019-01-14 DIAGNOSIS — G89.29 CHRONIC RIGHT SHOULDER PAIN: Primary | ICD-10-CM

## 2019-01-14 DIAGNOSIS — M25.511 CHRONIC RIGHT SHOULDER PAIN: Primary | ICD-10-CM

## 2019-01-14 PROCEDURE — 99213 OFFICE O/P EST LOW 20 MIN: CPT | Performed by: PHYSICIAN ASSISTANT

## 2019-01-14 NOTE — PROGRESS NOTES
"    [facility] [place] [facility] [place]    Subjective     CC: Follow-up of the Right Shoulder (Acute Pain of Right Shoulder /6 week f/u/)      HPI    Cecille Quinn is a 48 y.o. female.  Patient returns today for follow-up evaluation of her right shoulder.  She continues to endorse pain scale 6/10.  Severity the pain moderate.  Quality pain aching, shooting.  Associated symptoms stiffness and giving away.  Worse when attempting to try and sleep.  No reported radiculopathy or paresthesias.  She's been working with physical therapy on regaining motion and has noticed improvement with motion but continues with same amount of pain, predominantly to the posterior lateral aspect.       ROS:    Constiutional:Pt denies fever, chills, nausea, or vomiting.  MSK:as above    Objective      Past Medical History  Past Medical History:   Diagnosis Date   • Arthritis    • Atrial fibrillation (CMS/HCC)    • Depression    • H/O blood clots          Physical Exam  Pulse 90   Ht 170.2 cm (67.01\")   Wt 67.4 kg (148 lb 9.4 oz)   SpO2 98%   BMI 23.27 kg/m²     Body mass index is 23.27 kg/m².    Patient is well nourished and well developed.        Ortho Exam  Peripheral Vascular   Right Upper Extremity    No cyanotic nail beds    Pink nail beds and rapid capillary refill    Palpation    Radial Pulse - Bilaterally normal    Musculoskeletal   Upper Extremity   Right Shoulder     Inspection and Palpation:    Sensation is normal     Strength and Tone:    Supraspinatus - 4-/5    Infraspinatus - 4-/5    Biceps - 5/5    Subscapularis-4/5      AC joint tenderness:  Mild discomfort     Range of Motion   Right Shoulder:    Internal Rotation: ROM - hip pocket     External Rotation: AROM - 45 degrees    Elevation through flexion: AROM - 140+ degrees      Impingement   Right shoulder    Vail-Charli impingement test positive    Neer impingement test positive   Functional Testing   Right shoulder                          AC crossover adduction " test positive                          Abdominal compression test positive                          Lift-off sign positive                          Speed's test positive                          Ford's test positive      Imaging/Labs/EMG Reviewed:    Imaging Results (last 24 hours)     ** No results found for the last 24 hours. **      None today.    Assessment:  1. Chronic right shoulder pain    2. Incomplete tear of right rotator cuff        Plan:  1. Discussion was had with patient regarding exam, assessment and treatment options for her right shoulder pain with known rotator cuff tears.  2. Based on her history of PEs and current treatment on her Eliquis, Dr. Bazzi prefers that she continue with physical therapy versus proceeding on with surgical intervention.   3. She'll continue working with physical therapy.  4. Continue with over-the-counter pain medications as needed.  5. Referral to pain management was placed to see what they can do to help assist with pain control.   6. Follow-up in 2 months for repeat evaluation.  7. Question and concerns answered.      Patient was examined by Dr. Bazzi and he agrees with the above assessment and plan.      Medical Decision Making  Management Options : over-the-counter medicine, pain management referral      Clarissa Dao PA-C  01/14/19  1:08 PM

## 2019-02-11 ENCOUNTER — DOCUMENTATION (OUTPATIENT)
Dept: PHYSICAL THERAPY | Facility: HOSPITAL | Age: 49
End: 2019-02-11

## 2019-02-11 DIAGNOSIS — M25.511 RIGHT SHOULDER PAIN, UNSPECIFIED CHRONICITY: Primary | ICD-10-CM

## 2019-02-11 DIAGNOSIS — M54.42 ACUTE LEFT-SIDED LOW BACK PAIN WITH LEFT-SIDED SCIATICA: ICD-10-CM

## 2019-02-11 NOTE — THERAPY DISCHARGE NOTE
Outpatient Physical Therapy Discharge Summary         Patient Name: Cecille Quinn  : 1970  MRN: [id number]    Today's Date: 2019    Visit Dx:    ICD-10-CM ICD-9-CM   1. Right shoulder pain, unspecified chronicity M25.511 719.41   2. Acute left-sided low back pain with left-sided sciatica M54.42 724.2     724.3       PT OP Goals     Row Name 19 1600          PT Short Term Goals    STG Date to Achieve  10/31/18  -JEANINE     STG 1  Patient to be compliant with initial HEP  -JEANINE     STG 1 Progress  Met  -JEANINE     STG 2  Patient to elevate the shoulder in the scapular plane to at least 125 degrees  -JEANINE     STG 2 Progress  Met  -JEANINE        Long Term Goals    LTG Date to Achieve  18  -JEANINE     LTG 1  Patient to be independent with final HEP via handouts  -JEANINE     LTG 1 Progress  Ongoing  -JEANINE     LTG 2  Patient to improve Quick Dash score to at least 38%  -JEANINE     LTG 2 Progress  Ongoing  -JEANINE     LTG 3  Patient to acheive HBB to at least L2 with minimal pain  -JEANINE     LTG 3 Progress  Ongoing  -JEANINE     LTG 4  Patient to demonstrate 4+/5 all RUE planes  -JEANINE     LTG 4 Progress  Met  -JEANINE       User Key  (r) = Recorded By, (t) = Taken By, (c) = Cosigned By    Initials Name Provider Type    Lan Gonsalez, PT Physical Therapist          OP PT Discharge Summary  Date of Discharge: 19  Reason for Discharge: Non-compliant  Outcomes Achieved: Patient able to partially acheive established goals, Refer to plan of care for updates on goals achieved  Discharge Destination: Unknown, Home with home program  Discharge Instructions/Additional Comments: Patient was making good progress towards her goals but did not follow up after her ortho follow up without explanation.   visits attended.      Time Calculation:        Therapy Suggested Charges     Code   Minutes Charges    None                       Lan William, PT  2019

## 2019-03-11 ENCOUNTER — OFFICE VISIT (OUTPATIENT)
Dept: ORTHOPEDIC SURGERY | Facility: CLINIC | Age: 49
End: 2019-03-11

## 2019-03-11 VITALS — HEIGHT: 67 IN | OXYGEN SATURATION: 99 % | BODY MASS INDEX: 24.48 KG/M2 | HEART RATE: 76 BPM | WEIGHT: 156 LBS

## 2019-03-11 DIAGNOSIS — G89.29 CHRONIC RIGHT SHOULDER PAIN: Primary | ICD-10-CM

## 2019-03-11 DIAGNOSIS — M75.111 INCOMPLETE TEAR OF RIGHT ROTATOR CUFF: ICD-10-CM

## 2019-03-11 DIAGNOSIS — M25.511 CHRONIC RIGHT SHOULDER PAIN: Primary | ICD-10-CM

## 2019-03-11 PROCEDURE — 99213 OFFICE O/P EST LOW 20 MIN: CPT | Performed by: PHYSICIAN ASSISTANT

## 2019-03-11 RX ORDER — DICLOFENAC SODIUM 75 MG/1
TABLET, DELAYED RELEASE ORAL
Status: ON HOLD | COMMUNITY
Start: 2019-02-11 | End: 2019-05-06

## 2019-03-11 NOTE — PROGRESS NOTES
"    [facility] [place] [place] Orthopaedic Surgery Clinic Note    Subjective     CC: Follow-up of the Right Shoulder (8 weeks)      HPI    Cecille Quinn is a 49 y.o. female.  Patient returns today for follow-up evaluation of her right shoulder.  Since her last visit she has been working with physical therapy.  She continues to have pain scale maximum 5/10.  Severity the pain moderate.  Quality the pain aching, stabbing.  Associated symptoms stiffness, giving away.  Symptoms are worse with any type of lifting, reaching, or trying to sleep.  No reported radiculopathy or paresthesias.  Pain once again noted to posterior lateral aspect of the shoulder.      ROS:    Constiutional:Pt denies fever, chills, nausea, or vomiting.  MSK:as above    Objective      Past Medical History  Past Medical History:   Diagnosis Date   • Arthritis    • Atrial fibrillation (CMS/HCC)    • Depression    • H/O blood clots          Physical Exam  Pulse 76   Ht 170.2 cm (67.01\")   Wt 70.8 kg (156 lb)   SpO2 99%   BMI 24.43 kg/m²     Body mass index is 24.43 kg/m².    Patient is well nourished and well developed.        Ortho Exam  Peripheral Vascular              Right Upper Extremity                          No cyanotic nail beds                          Pink nail beds and rapid capillary refill                          Palpation                          Radial Pulse - Bilaterally normal     Musculoskeletal              Upper Extremity              Right Shoulder                           Inspection and Palpation:                          Sensation is normal                          Strength and Tone:                          Supraspinatus - 4-/5                          Infraspinatus - 4-/5                          Biceps - 5/5                          Subscapularis-4/5                             AC joint tenderness:  Mild discomfort                 Range of Motion              Right Shoulder: Positive pain with any type of range of motion.                  "         Internal Rotation: ROM - hip pocket                             External Rotation: AROM - 45 degrees                          Elevation through flexion: AROM - 140-150 degrees                Impingement              Right shoulder                          Vail-Charli impingement test positive                          Neer impingement test positive              Functional Testing              Right shoulder                          AC crossover adduction test positive                          Abdominal compression test positive                          Lift-off sign positive                          Speed's test positive                          Ford's test positive    Imaging/Labs/EMG Reviewed:    Imaging Results (last 24 hours)     ** No results found for the last 24 hours. **      Dr. Bazzi and I reviewed her MRI, once again, that was performed on 10/30/2018 which shows tear of the supraspinatus without retraction and tendinitis noted of the infraspinatus.    Assessment:  1. Chronic right shoulder pain    2. Incomplete tear of right rotator cuff        Plan:  1. Discussion was had with patient regarding exam, assessment and treatment options for her right shoulder pain with known rotator cuff tear and tendinopathy.  2. Patient saw her cardiologist 1/18/2019.  She has been taken off her Eliquis and cleared for shoulder surgery.  3. Continue with over-the-counter pain medications as needed.  4. Patient scheduled for right shoulder arthroscopy with rotator cuff repair.  5. Question and concerns answered.     Patient was examined by Dr. Bazzi and he agrees with the above assessment and plan.    Indications, risks, benefits and alternatives of surgical versus continued nonsurgical treatment were discussed with the patient. Surgical risks include but are not limited to pain, bleeding, infection, failure to relieve symptoms, need for further procedures, recurrence of symptoms, damage to healthy adjacent  structures, hardware loosening/failure, stiffness, weakness, scar, DVT/PE, loss of limb or life. We also discussed the postoperative protocol and expected outcome. All questions were answered; the patient would like to proceed with surgical intervention.       Medical Decision Making  Management Options : over-the-counter medicine and major surgery with risk factors  Data/Risk: radiology tests    Clarissa Dao PA-C  03/11/19  12:54 PM

## 2019-03-19 ENCOUNTER — OUTSIDE FACILITY SERVICE (OUTPATIENT)
Dept: ORTHOPEDIC SURGERY | Facility: CLINIC | Age: 49
End: 2019-03-19

## 2019-03-19 PROCEDURE — 29825 SHO ARTHRS SRG LSS&RESCJ ADS: CPT | Performed by: ORTHOPAEDIC SURGERY

## 2019-03-25 ENCOUNTER — OFFICE VISIT (OUTPATIENT)
Dept: ORTHOPEDIC SURGERY | Facility: CLINIC | Age: 49
End: 2019-03-25

## 2019-03-25 DIAGNOSIS — M75.01 ADHESIVE CAPSULITIS OF RIGHT SHOULDER: Primary | ICD-10-CM

## 2019-03-25 DIAGNOSIS — Z47.89 ORTHOPEDIC AFTERCARE: ICD-10-CM

## 2019-03-25 PROCEDURE — 99024 POSTOP FOLLOW-UP VISIT: CPT | Performed by: ORTHOPAEDIC SURGERY

## 2019-03-25 NOTE — PROGRESS NOTES
Chief Complaint   Patient presents with   • Post-op     1 week status post right shoulder arthroscopy with lysis of adhesives 03/19/19           HPI    Is doing better follow-up right shoulder lysis of adhesions for adhesive capsulitis on March 19.    There were no vitals filed for this visit.      Physical Exam:  Neuro vascular intact.  She gets about 160 degrees of flexion action.          ICD-10-CM ICD-9-CM   1. Adhesive capsulitis of right shoulder M75.01 726.0   2. Orthopedic aftercare Z47.89 V54.9       Orders Placed This Encounter   Procedures   • Ambulatory Referral to Physical Therapy     Continue physical therapy and follow-up in 1 month.

## 2019-03-26 ENCOUNTER — HOSPITAL ENCOUNTER (OUTPATIENT)
Dept: PHYSICAL THERAPY | Facility: HOSPITAL | Age: 49
Setting detail: THERAPIES SERIES
Discharge: HOME | End: 2019-03-26

## 2019-03-26 ENCOUNTER — TRANSCRIBE ORDERS (OUTPATIENT)
Dept: PHYSICAL THERAPY | Facility: HOSPITAL | Age: 49
End: 2019-03-26

## 2019-03-26 DIAGNOSIS — M25.511 RIGHT SHOULDER PAIN, UNSPECIFIED CHRONICITY: Primary | ICD-10-CM

## 2019-03-26 DIAGNOSIS — M75.101 TEAR OF RIGHT ROTATOR CUFF, UNSPECIFIED TEAR EXTENT: Primary | ICD-10-CM

## 2019-03-26 PROCEDURE — 97161 PT EVAL LOW COMPLEX 20 MIN: CPT | Performed by: PHYSICAL THERAPIST

## 2019-03-26 PROCEDURE — 97110 THERAPEUTIC EXERCISES: CPT | Performed by: PHYSICAL THERAPIST
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
## 2019-03-27 ENCOUNTER — APPOINTMENT (OUTPATIENT)
Dept: PHYSICAL THERAPY | Facility: HOSPITAL | Age: 49
End: 2019-03-27

## 2019-03-29 ENCOUNTER — APPOINTMENT (OUTPATIENT)
Dept: PHYSICAL THERAPY | Facility: HOSPITAL | Age: 49
End: 2019-03-29

## 2019-04-02 ENCOUNTER — APPOINTMENT (OUTPATIENT)
Dept: PHYSICAL THERAPY | Facility: HOSPITAL | Age: 49
End: 2019-04-02

## 2019-04-02 ENCOUNTER — HOSPITAL ENCOUNTER (OUTPATIENT)
Dept: PHYSICAL THERAPY | Facility: HOSPITAL | Age: 49
Setting detail: THERAPIES SERIES
Discharge: HOME | End: 2019-04-02

## 2019-04-02 DIAGNOSIS — M25.511 RIGHT SHOULDER PAIN, UNSPECIFIED CHRONICITY: Primary | ICD-10-CM

## 2019-04-02 PROCEDURE — 97110 THERAPEUTIC EXERCISES: CPT | Performed by: PHYSICAL THERAPIST

## 2019-04-02 PROCEDURE — 97140 MANUAL THERAPY 1/> REGIONS: CPT | Performed by: PHYSICAL THERAPIST
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
## 2019-04-04 ENCOUNTER — TRANSCRIBE ORDERS (OUTPATIENT)
Dept: PHYSICAL THERAPY | Facility: HOSPITAL | Age: 49
End: 2019-04-04

## 2019-04-04 ENCOUNTER — HOSPITAL ENCOUNTER (OUTPATIENT)
Dept: PHYSICAL THERAPY | Facility: HOSPITAL | Age: 49
Setting detail: THERAPIES SERIES
Discharge: HOME | End: 2019-04-04

## 2019-04-04 DIAGNOSIS — M75.01 ADHESIVE CAPSULITIS OF RIGHT SHOULDER: Primary | ICD-10-CM

## 2019-04-04 DIAGNOSIS — M25.511 RIGHT SHOULDER PAIN, UNSPECIFIED CHRONICITY: Primary | ICD-10-CM

## 2019-04-04 PROCEDURE — 97140 MANUAL THERAPY 1/> REGIONS: CPT | Performed by: PHYSICAL THERAPIST

## 2019-04-04 PROCEDURE — 97110 THERAPEUTIC EXERCISES: CPT | Performed by: PHYSICAL THERAPIST
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
## 2019-04-11 ENCOUNTER — HOSPITAL ENCOUNTER (OUTPATIENT)
Dept: PHYSICAL THERAPY | Facility: HOSPITAL | Age: 49
Setting detail: THERAPIES SERIES
Discharge: HOME | End: 2019-04-11

## 2019-04-11 DIAGNOSIS — M25.511 RIGHT SHOULDER PAIN, UNSPECIFIED CHRONICITY: Primary | ICD-10-CM

## 2019-04-11 PROCEDURE — 97110 THERAPEUTIC EXERCISES: CPT | Performed by: PHYSICAL THERAPIST
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
## 2019-04-18 ENCOUNTER — HOSPITAL ENCOUNTER (OUTPATIENT)
Dept: PHYSICAL THERAPY | Facility: HOSPITAL | Age: 49
Setting detail: THERAPIES SERIES
Discharge: HOME | End: 2019-04-18

## 2019-04-18 DIAGNOSIS — M54.42 ACUTE LEFT-SIDED LOW BACK PAIN WITH LEFT-SIDED SCIATICA: ICD-10-CM

## 2019-04-18 DIAGNOSIS — M25.511 RIGHT SHOULDER PAIN, UNSPECIFIED CHRONICITY: Primary | ICD-10-CM

## 2019-04-18 PROCEDURE — 97110 THERAPEUTIC EXERCISES: CPT | Performed by: PHYSICAL THERAPIST
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
## 2019-04-22 ENCOUNTER — OFFICE VISIT (OUTPATIENT)
Dept: ORTHOPEDIC SURGERY | Facility: CLINIC | Age: 49
End: 2019-04-22

## 2019-04-22 DIAGNOSIS — Z47.89 ORTHOPEDIC AFTERCARE: ICD-10-CM

## 2019-04-22 DIAGNOSIS — M75.01 ADHESIVE CAPSULITIS OF RIGHT SHOULDER: Primary | ICD-10-CM

## 2019-04-22 PROCEDURE — 99024 POSTOP FOLLOW-UP VISIT: CPT | Performed by: ORTHOPAEDIC SURGERY

## 2019-04-22 NOTE — PROGRESS NOTES
Chief Complaint   Patient presents with   • Right Shoulder - Follow-up     1 month f/u  1 month post status post right shoulder arthroscopy with lysis of adhesives 03/19/19           HPI    She is doing well follow-up right shoulder lysis of adhesions  There were no vitals filed for this visit.      Physical Exam:    Musculoskeletal   Upper Extremity   Right Shoulder     Inspection and Palpation:     Tenderness - none    Crepitus - none    Sensation is normal    Examination reveals no ecchymosis.      Strength and Tone:    Supraspinatus,  Infraspinatus - 5/5    Subscapularis - 5/5    Deltoid - 5/5     Range of Motion   Left shoulder:    Internal Rotation: ROM - T7    External Rotation: AROM - 80 degrees    Elevation through flexion: AROM - 180 degrees    Right Shoulder:    Internal Rotation: ROM - T7    External Rotation: AROM - 80 degrees    Elevation through flexion: AROM - 180 degrees     Abduction -180 degrees     Instability   Right shoulder    Sulcus sign negative    Apprehension test negative    Sophie relocation test negative    Jerk test negative   Impingement   Right shoulder    Vail impingement test positive    Neer impingement test positive   Functional Testing   Right shoulder    AC crossover adduction test negative    Abdominal compression test negative    Lift-off sign negative    Speed's test negative    Ford's test negative    Horriblower's sign negative       X-RAY REPORT:  Imaging Results (last 7 days)     ** No results found for the last 168 hours. **                ICD-10-CM ICD-9-CM   1. Adhesive capsulitis of right shoulder M75.01 726.0   2. Orthopedic aftercare Z47.89 V54.9     She will continue a home exercise program and follow-up as needed

## 2019-05-04 ENCOUNTER — APPOINTMENT (OUTPATIENT)
Dept: GENERAL RADIOLOGY | Facility: HOSPITAL | Age: 49
End: 2019-05-04

## 2019-05-04 ENCOUNTER — HOSPITAL ENCOUNTER (INPATIENT)
Facility: HOSPITAL | Age: 49
LOS: 2 days | Discharge: HOME | End: 2019-05-07
Attending: EMERGENCY MEDICINE | Admitting: INTERNAL MEDICINE

## 2019-05-04 DIAGNOSIS — E87.29 HIGH ANION GAP METABOLIC ACIDOSIS: ICD-10-CM

## 2019-05-04 DIAGNOSIS — I95.9 HYPOTENSION, UNSPECIFIED HYPOTENSION TYPE: ICD-10-CM

## 2019-05-04 DIAGNOSIS — T50.901A DRUG OVERDOSE, ACCIDENTAL OR UNINTENTIONAL, INITIAL ENCOUNTER: Primary | ICD-10-CM

## 2019-05-04 DIAGNOSIS — R40.0 SOMNOLENCE: ICD-10-CM

## 2019-05-04 LAB
ALBUMIN SERPL-MCNC: 4 G/DL (ref 3.5–5.2)
ALBUMIN/GLOB SERPL: 1.1 G/DL
ALP SERPL-CCNC: 142 U/L (ref 39–117)
ALT SERPL W P-5'-P-CCNC: 26 U/L (ref 1–33)
AMPHET+METHAMPHET UR QL: NEGATIVE
AMPHETAMINES UR QL: NEGATIVE
ANION GAP SERPL CALCULATED.3IONS-SCNC: 19 MMOL/L
AST SERPL-CCNC: 34 U/L (ref 1–32)
BACTERIA UR QL AUTO: ABNORMAL /HPF
BARBITURATES UR QL SCN: NEGATIVE
BASOPHILS # BLD AUTO: 0.02 10*3/MM3 (ref 0–0.2)
BASOPHILS NFR BLD AUTO: 0.1 % (ref 0–1.5)
BENZODIAZ UR QL SCN: POSITIVE
BILIRUB SERPL-MCNC: 0.2 MG/DL (ref 0.2–1.2)
BILIRUB UR QL STRIP: NEGATIVE
BUN BLD-MCNC: 11 MG/DL (ref 6–20)
BUN/CREAT SERPL: 9.9 (ref 7–25)
BUPRENORPHINE SERPL-MCNC: NEGATIVE NG/ML
CALCIUM SPEC-SCNC: 8.8 MG/DL (ref 8.6–10.5)
CANNABINOIDS SERPL QL: POSITIVE
CHLORIDE SERPL-SCNC: 103 MMOL/L (ref 98–107)
CLARITY UR: CLEAR
CO2 SERPL-SCNC: 21 MMOL/L (ref 22–29)
COCAINE UR QL: POSITIVE
COLOR UR: YELLOW
CREAT BLD-MCNC: 1.11 MG/DL (ref 0.57–1)
DEPRECATED RDW RBC AUTO: 48.5 FL (ref 37–54)
EOSINOPHIL # BLD AUTO: 0.02 10*3/MM3 (ref 0–0.4)
EOSINOPHIL NFR BLD AUTO: 0.1 % (ref 0.3–6.2)
ERYTHROCYTE [DISTWIDTH] IN BLOOD BY AUTOMATED COUNT: 15.1 % (ref 12.3–15.4)
GFR SERPL CREATININE-BSD FRML MDRD: 52 ML/MIN/1.73
GLOBULIN UR ELPH-MCNC: 3.6 GM/DL
GLUCOSE BLD-MCNC: 231 MG/DL (ref 65–99)
GLUCOSE UR STRIP-MCNC: ABNORMAL MG/DL
HCT VFR BLD AUTO: 39.6 % (ref 34–46.6)
HGB BLD-MCNC: 12.5 G/DL (ref 12–15.9)
HGB UR QL STRIP.AUTO: ABNORMAL
HYALINE CASTS UR QL AUTO: ABNORMAL /LPF
IMM GRANULOCYTES # BLD AUTO: 0.12 10*3/MM3 (ref 0–0.05)
IMM GRANULOCYTES NFR BLD AUTO: 0.5 % (ref 0–0.5)
KETONES UR QL STRIP: NEGATIVE
LEUKOCYTE ESTERASE UR QL STRIP.AUTO: NEGATIVE
LYMPHOCYTES # BLD AUTO: 1.02 10*3/MM3 (ref 0.7–3.1)
LYMPHOCYTES NFR BLD AUTO: 4 % (ref 19.6–45.3)
MCH RBC QN AUTO: 27.5 PG (ref 26.6–33)
MCHC RBC AUTO-ENTMCNC: 31.6 G/DL (ref 31.5–35.7)
MCV RBC AUTO: 87.2 FL (ref 79–97)
METHADONE UR QL SCN: NEGATIVE
MONOCYTES # BLD AUTO: 0.52 10*3/MM3 (ref 0.1–0.9)
MONOCYTES NFR BLD AUTO: 2 % (ref 5–12)
NEUTROPHILS # BLD AUTO: 23.96 10*3/MM3 (ref 1.7–7)
NEUTROPHILS NFR BLD AUTO: 93.3 % (ref 42.7–76)
NITRITE UR QL STRIP: NEGATIVE
OPIATES UR QL: NEGATIVE
OXYCODONE UR QL SCN: NEGATIVE
PCP UR QL SCN: NEGATIVE
PH UR STRIP.AUTO: 5.5 [PH] (ref 5–8)
PLATELET # BLD AUTO: 324 10*3/MM3 (ref 140–450)
PMV BLD AUTO: 10.8 FL (ref 6–12)
POTASSIUM BLD-SCNC: 3.8 MMOL/L (ref 3.5–5.2)
PROPOXYPH UR QL: NEGATIVE
PROT SERPL-MCNC: 7.6 G/DL (ref 6–8.5)
PROT UR QL STRIP: ABNORMAL
RBC # BLD AUTO: 4.54 10*6/MM3 (ref 3.77–5.28)
RBC # UR: ABNORMAL /HPF
REF LAB TEST METHOD: ABNORMAL
SODIUM BLD-SCNC: 143 MMOL/L (ref 136–145)
SP GR UR STRIP: 1.01 (ref 1–1.03)
SQUAMOUS #/AREA URNS HPF: ABNORMAL /HPF
TRICYCLICS UR QL SCN: NEGATIVE
TROPONIN T SERPL-MCNC: <0.01 NG/ML (ref 0–0.03)
UROBILINOGEN UR QL STRIP: ABNORMAL
WBC NRBC COR # BLD: 25.66 10*3/MM3 (ref 3.4–10.8)
WBC UR QL AUTO: ABNORMAL /HPF

## 2019-05-04 PROCEDURE — 87086 URINE CULTURE/COLONY COUNT: CPT | Performed by: EMERGENCY MEDICINE

## 2019-05-04 PROCEDURE — 93005 ELECTROCARDIOGRAM TRACING: CPT | Performed by: EMERGENCY MEDICINE

## 2019-05-04 PROCEDURE — 82533 TOTAL CORTISOL: CPT | Performed by: INTERNAL MEDICINE

## 2019-05-04 PROCEDURE — 99291 CRITICAL CARE FIRST HOUR: CPT

## 2019-05-04 PROCEDURE — 80306 DRUG TEST PRSMV INSTRMNT: CPT | Performed by: EMERGENCY MEDICINE

## 2019-05-04 PROCEDURE — 84145 PROCALCITONIN (PCT): CPT | Performed by: EMERGENCY MEDICINE

## 2019-05-04 PROCEDURE — 71045 X-RAY EXAM CHEST 1 VIEW: CPT

## 2019-05-04 PROCEDURE — 84484 ASSAY OF TROPONIN QUANT: CPT | Performed by: EMERGENCY MEDICINE

## 2019-05-04 PROCEDURE — 81001 URINALYSIS AUTO W/SCOPE: CPT | Performed by: EMERGENCY MEDICINE

## 2019-05-04 PROCEDURE — 85025 COMPLETE CBC W/AUTO DIFF WBC: CPT | Performed by: EMERGENCY MEDICINE

## 2019-05-04 PROCEDURE — 80053 COMPREHEN METABOLIC PANEL: CPT | Performed by: EMERGENCY MEDICINE

## 2019-05-04 PROCEDURE — 82550 ASSAY OF CK (CPK): CPT | Performed by: EMERGENCY MEDICINE

## 2019-05-04 RX ORDER — SODIUM CHLORIDE 0.9 % (FLUSH) 0.9 %
10 SYRINGE (ML) INJECTION AS NEEDED
Status: DISCONTINUED | OUTPATIENT
Start: 2019-05-04 | End: 2019-05-07

## 2019-05-04 RX ORDER — NOREPINEPHRINE BIT/0.9 % NACL 8 MG/250ML
.02-.3 INFUSION BOTTLE (ML) INTRAVENOUS
Status: DISCONTINUED | OUTPATIENT
Start: 2019-05-04 | End: 2019-05-06

## 2019-05-04 RX ADMIN — SODIUM CHLORIDE 1000 ML: 9 INJECTION, SOLUTION INTRAVENOUS at 21:37

## 2019-05-04 RX ADMIN — SODIUM CHLORIDE 1000 ML: 9 INJECTION, SOLUTION INTRAVENOUS at 23:46

## 2019-05-05 ENCOUNTER — APPOINTMENT (OUTPATIENT)
Dept: CARDIOLOGY | Facility: HOSPITAL | Age: 49
End: 2019-05-05

## 2019-05-05 PROBLEM — M54.42 LEFT-SIDED LOW BACK PAIN WITH LEFT-SIDED SCIATICA: Status: RESOLVED | Noted: 2018-04-30 | Resolved: 2019-05-05

## 2019-05-05 PROBLEM — Z86.718 H/O BLOOD CLOTS: Status: ACTIVE | Noted: 2019-05-05

## 2019-05-05 PROBLEM — T50.901A OVERDOSE: Status: ACTIVE | Noted: 2019-05-05

## 2019-05-05 PROBLEM — S06.9XAA BRAIN INJURY (HCC): Status: ACTIVE | Noted: 2019-05-05

## 2019-05-05 PROBLEM — R57.9 SHOCK (HCC): Status: ACTIVE | Noted: 2019-05-05

## 2019-05-05 PROBLEM — I48.91 ATRIAL FIBRILLATION (HCC): Status: ACTIVE | Noted: 2019-05-05

## 2019-05-05 PROBLEM — T50.901A DRUG OVERDOSE, ACCIDENTAL OR UNINTENTIONAL, INITIAL ENCOUNTER: Status: ACTIVE | Noted: 2019-05-05

## 2019-05-05 LAB
ANION GAP SERPL CALCULATED.3IONS-SCNC: 19 MMOL/L
BASOPHILS # BLD AUTO: 0.02 10*3/MM3 (ref 0–0.2)
BASOPHILS NFR BLD AUTO: 0.1 % (ref 0–1.5)
BH CV ECHO MEAS - AO ROOT AREA (BSA CORRECTED): 1.4
BH CV ECHO MEAS - AO ROOT AREA: 5.3 CM^2
BH CV ECHO MEAS - AO ROOT DIAM: 2.6 CM
BH CV ECHO MEAS - BSA(HAYCOCK): 1.9 M^2
BH CV ECHO MEAS - BSA: 1.8 M^2
BH CV ECHO MEAS - BZI_BMI: 25.2 KILOGRAMS/M^2
BH CV ECHO MEAS - BZI_METRIC_HEIGHT: 170.2 CM
BH CV ECHO MEAS - BZI_METRIC_WEIGHT: 73 KG
BH CV ECHO MEAS - EDV(CUBED): 83.5 ML
BH CV ECHO MEAS - EDV(TEICH): 86.3 ML
BH CV ECHO MEAS - EF(CUBED): 77.5 %
BH CV ECHO MEAS - EF(TEICH): 69.9 %
BH CV ECHO MEAS - ESV(CUBED): 18.8 ML
BH CV ECHO MEAS - ESV(TEICH): 26 ML
BH CV ECHO MEAS - FS: 39.2 %
BH CV ECHO MEAS - IVS/LVPW: 0.96
BH CV ECHO MEAS - IVSD: 1.1 CM
BH CV ECHO MEAS - LA DIMENSION: 2.6 CM
BH CV ECHO MEAS - LA/AO: 1
BH CV ECHO MEAS - LAD MAJOR: 4.4 CM
BH CV ECHO MEAS - LAT PEAK E' VEL: 11.7 CM/SEC
BH CV ECHO MEAS - LATERAL E/E' RATIO: 7.3
BH CV ECHO MEAS - LV MASS(C)D: 175.5 GRAMS
BH CV ECHO MEAS - LV MASS(C)DI: 95.2 GRAMS/M^2
BH CV ECHO MEAS - LVIDD: 4.4 CM
BH CV ECHO MEAS - LVIDS: 2.7 CM
BH CV ECHO MEAS - LVPWD: 1.2 CM
BH CV ECHO MEAS - MED PEAK E' VEL: 7.8 CM/SEC
BH CV ECHO MEAS - MEDIAL E/E' RATIO: 10.9
BH CV ECHO MEAS - MV A MAX VEL: 82.4 CM/SEC
BH CV ECHO MEAS - MV DEC SLOPE: 797.1 CM/SEC^2
BH CV ECHO MEAS - MV DEC TIME: 0.12 SEC
BH CV ECHO MEAS - MV E MAX VEL: 86.9 CM/SEC
BH CV ECHO MEAS - MV E/A: 1.1
BH CV ECHO MEAS - MV P1/2T MAX VEL: 115.4 CM/SEC
BH CV ECHO MEAS - MV P1/2T: 42.4 MSEC
BH CV ECHO MEAS - MVA P1/2T LCG: 1.9 CM^2
BH CV ECHO MEAS - MVA(P1/2T): 5.2 CM^2
BH CV ECHO MEAS - PA ACC SLOPE: 781.2 CM/SEC^2
BH CV ECHO MEAS - PA ACC TIME: 0.1 SEC
BH CV ECHO MEAS - PA PR(ACCEL): 32.7 MMHG
BH CV ECHO MEAS - RAP SYSTOLE: 3 MMHG
BH CV ECHO MEAS - RV MAX PG: 0.62 MMHG
BH CV ECHO MEAS - RV V1 MAX: 39.5 CM/SEC
BH CV ECHO MEAS - RVSP: 26 MMHG
BH CV ECHO MEAS - SI(CUBED): 35.1 ML/M^2
BH CV ECHO MEAS - SI(TEICH): 32.7 ML/M^2
BH CV ECHO MEAS - SV(CUBED): 64.7 ML
BH CV ECHO MEAS - SV(TEICH): 60.3 ML
BH CV ECHO MEAS - TAPSE (>1.6): 1.7 CM2
BH CV ECHO MEAS - TR MAX PG: 23 MMHG
BH CV ECHO MEAS - TR MAX VEL: 236.8 CM/SEC
BH CV ECHO MEASUREMENTS AVERAGE E/E' RATIO: 8.91
BH CV XLRA - RV BASE: 4.3 CM
BH CV XLRA - RV LENGTH: 6.8 CM
BH CV XLRA - RV MID: 3.9 CM
BH CV XLRA - TDI S': 10.4 CM/SEC
BUN BLD-MCNC: 10 MG/DL (ref 6–20)
BUN/CREAT SERPL: 12.8 (ref 7–25)
CALCIUM SPEC-SCNC: 8 MG/DL (ref 8.6–10.5)
CHLORIDE SERPL-SCNC: 108 MMOL/L (ref 98–107)
CK SERPL-CCNC: 106 U/L (ref 20–180)
CO2 SERPL-SCNC: 17 MMOL/L (ref 22–29)
CORTIS SERPL-MCNC: 21.67 MCG/DL
CREAT BLD-MCNC: 0.78 MG/DL (ref 0.57–1)
D-LACTATE SERPL-SCNC: 1.2 MMOL/L (ref 0.5–2)
D-LACTATE SERPL-SCNC: 2.3 MMOL/L (ref 0.5–2)
DEPRECATED RDW RBC AUTO: 48.2 FL (ref 37–54)
EOSINOPHIL # BLD AUTO: 0.02 10*3/MM3 (ref 0–0.4)
EOSINOPHIL NFR BLD AUTO: 0.1 % (ref 0.3–6.2)
ERYTHROCYTE [DISTWIDTH] IN BLOOD BY AUTOMATED COUNT: 15.2 % (ref 12.3–15.4)
GFR SERPL CREATININE-BSD FRML MDRD: 78 ML/MIN/1.73
GLUCOSE BLD-MCNC: 83 MG/DL (ref 65–99)
GLUCOSE BLDC GLUCOMTR-MCNC: 100 MG/DL (ref 70–130)
GLUCOSE BLDC GLUCOMTR-MCNC: 74 MG/DL (ref 70–130)
GLUCOSE BLDC GLUCOMTR-MCNC: 89 MG/DL (ref 70–130)
GLUCOSE BLDC GLUCOMTR-MCNC: 96 MG/DL (ref 70–130)
GLUCOSE BLDC GLUCOMTR-MCNC: 98 MG/DL (ref 70–130)
HCT VFR BLD AUTO: 34.6 % (ref 34–46.6)
HGB BLD-MCNC: 11 G/DL (ref 12–15.9)
HOLD SPECIMEN: NORMAL
IMM GRANULOCYTES # BLD AUTO: 0.04 10*3/MM3 (ref 0–0.05)
IMM GRANULOCYTES NFR BLD AUTO: 0.3 % (ref 0–0.5)
LEFT ATRIUM VOLUME INDEX: 20.1 ML/M^2
LEFT ATRIUM VOLUME: 37 ML
LYMPHOCYTES # BLD AUTO: 2.6 10*3/MM3 (ref 0.7–3.1)
LYMPHOCYTES NFR BLD AUTO: 16.7 % (ref 19.6–45.3)
MAGNESIUM SERPL-MCNC: 1.7 MG/DL (ref 1.6–2.6)
MCH RBC QN AUTO: 27.3 PG (ref 26.6–33)
MCHC RBC AUTO-ENTMCNC: 31.8 G/DL (ref 31.5–35.7)
MCV RBC AUTO: 85.9 FL (ref 79–97)
MONOCYTES # BLD AUTO: 0.88 10*3/MM3 (ref 0.1–0.9)
MONOCYTES NFR BLD AUTO: 5.7 % (ref 5–12)
NEUTROPHILS # BLD AUTO: 11.98 10*3/MM3 (ref 1.7–7)
NEUTROPHILS NFR BLD AUTO: 77.1 % (ref 42.7–76)
PHOSPHATE SERPL-MCNC: 3.3 MG/DL (ref 2.5–4.5)
PLATELET # BLD AUTO: 317 10*3/MM3 (ref 140–450)
PMV BLD AUTO: 10.7 FL (ref 6–12)
POTASSIUM BLD-SCNC: 4 MMOL/L (ref 3.5–5.2)
PROCALCITONIN SERPL-MCNC: 0.04 NG/ML (ref 0.1–0.25)
PROCALCITONIN SERPL-MCNC: 0.28 NG/ML (ref 0.1–0.25)
RBC # BLD AUTO: 4.03 10*6/MM3 (ref 3.77–5.28)
SODIUM BLD-SCNC: 144 MMOL/L (ref 136–145)
TROPONIN T SERPL-MCNC: <0.01 NG/ML (ref 0–0.03)
WBC NRBC COR # BLD: 15.54 10*3/MM3 (ref 3.4–10.8)

## 2019-05-05 PROCEDURE — 85025 COMPLETE CBC W/AUTO DIFF WBC: CPT | Performed by: INTERNAL MEDICINE

## 2019-05-05 PROCEDURE — 82962 GLUCOSE BLOOD TEST: CPT

## 2019-05-05 PROCEDURE — 93306 TTE W/DOPPLER COMPLETE: CPT | Performed by: INTERNAL MEDICINE

## 2019-05-05 PROCEDURE — 93306 TTE W/DOPPLER COMPLETE: CPT

## 2019-05-05 PROCEDURE — 83605 ASSAY OF LACTIC ACID: CPT | Performed by: EMERGENCY MEDICINE

## 2019-05-05 PROCEDURE — 84145 PROCALCITONIN (PCT): CPT | Performed by: INTERNAL MEDICINE

## 2019-05-05 PROCEDURE — 87040 BLOOD CULTURE FOR BACTERIA: CPT | Performed by: EMERGENCY MEDICINE

## 2019-05-05 PROCEDURE — 25010000002 PIPERACILLIN SOD-TAZOBACTAM PER 1 G: Performed by: EMERGENCY MEDICINE

## 2019-05-05 PROCEDURE — 84484 ASSAY OF TROPONIN QUANT: CPT | Performed by: NURSE PRACTITIONER

## 2019-05-05 PROCEDURE — 83735 ASSAY OF MAGNESIUM: CPT | Performed by: INTERNAL MEDICINE

## 2019-05-05 PROCEDURE — 99291 CRITICAL CARE FIRST HOUR: CPT | Performed by: INTERNAL MEDICINE

## 2019-05-05 PROCEDURE — 80048 BASIC METABOLIC PNL TOTAL CA: CPT | Performed by: INTERNAL MEDICINE

## 2019-05-05 PROCEDURE — 25010000002 PIPERACILLIN SOD-TAZOBACTAM PER 1 G: Performed by: INTERNAL MEDICINE

## 2019-05-05 PROCEDURE — 25010000002 HEPARIN (PORCINE) PER 1000 UNITS: Performed by: NURSE PRACTITIONER

## 2019-05-05 PROCEDURE — 84100 ASSAY OF PHOSPHORUS: CPT | Performed by: INTERNAL MEDICINE

## 2019-05-05 PROCEDURE — 25010000002 VANCOMYCIN 10 G RECONSTITUTED SOLUTION: Performed by: EMERGENCY MEDICINE

## 2019-05-05 PROCEDURE — 93010 ELECTROCARDIOGRAM REPORT: CPT | Performed by: INTERNAL MEDICINE

## 2019-05-05 PROCEDURE — 93005 ELECTROCARDIOGRAM TRACING: CPT | Performed by: INTERNAL MEDICINE

## 2019-05-05 RX ORDER — SODIUM CHLORIDE, SODIUM LACTATE, POTASSIUM CHLORIDE, CALCIUM CHLORIDE 600; 310; 30; 20 MG/100ML; MG/100ML; MG/100ML; MG/100ML
100 INJECTION, SOLUTION INTRAVENOUS CONTINUOUS
Status: DISCONTINUED | OUTPATIENT
Start: 2019-05-05 | End: 2019-05-06

## 2019-05-05 RX ORDER — SODIUM CHLORIDE 0.9 % (FLUSH) 0.9 %
3-10 SYRINGE (ML) INJECTION AS NEEDED
Status: DISCONTINUED | OUTPATIENT
Start: 2019-05-05 | End: 2019-05-07

## 2019-05-05 RX ORDER — MAGNESIUM SULFATE HEPTAHYDRATE 40 MG/ML
2 INJECTION, SOLUTION INTRAVENOUS AS NEEDED
Status: DISCONTINUED | OUTPATIENT
Start: 2019-05-05 | End: 2019-05-06

## 2019-05-05 RX ORDER — SODIUM CHLORIDE 0.9 % (FLUSH) 0.9 %
3 SYRINGE (ML) INJECTION EVERY 12 HOURS SCHEDULED
Status: DISCONTINUED | OUTPATIENT
Start: 2019-05-05 | End: 2019-05-07

## 2019-05-05 RX ORDER — MAGNESIUM SULFATE HEPTAHYDRATE 40 MG/ML
4 INJECTION, SOLUTION INTRAVENOUS AS NEEDED
Status: DISCONTINUED | OUTPATIENT
Start: 2019-05-05 | End: 2019-05-06

## 2019-05-05 RX ORDER — HEPARIN SODIUM 5000 [USP'U]/ML
5000 INJECTION, SOLUTION INTRAVENOUS; SUBCUTANEOUS EVERY 8 HOURS SCHEDULED
Status: DISCONTINUED | OUTPATIENT
Start: 2019-05-05 | End: 2019-05-06

## 2019-05-05 RX ORDER — ONDANSETRON 2 MG/ML
4 INJECTION INTRAMUSCULAR; INTRAVENOUS EVERY 6 HOURS PRN
Status: DISCONTINUED | OUTPATIENT
Start: 2019-05-05 | End: 2019-05-06

## 2019-05-05 RX ORDER — ACETAMINOPHEN 325 MG/1
650 TABLET ORAL EVERY 4 HOURS PRN
Status: DISCONTINUED | OUTPATIENT
Start: 2019-05-05 | End: 2019-05-07

## 2019-05-05 RX ADMIN — SODIUM CHLORIDE, POTASSIUM CHLORIDE, SODIUM LACTATE AND CALCIUM CHLORIDE 100 ML/HR: 600; 310; 30; 20 INJECTION, SOLUTION INTRAVENOUS at 12:10

## 2019-05-05 RX ADMIN — SODIUM CHLORIDE 1000 ML: 9 INJECTION, SOLUTION INTRAVENOUS at 00:00

## 2019-05-05 RX ADMIN — HEPARIN SODIUM 5000 UNITS: 5000 INJECTION INTRAVENOUS; SUBCUTANEOUS at 13:19

## 2019-05-05 RX ADMIN — MAGNESIUM SULFATE HEPTAHYDRATE 4 G: 40 INJECTION, SOLUTION INTRAVENOUS at 12:10

## 2019-05-05 RX ADMIN — VANCOMYCIN HYDROCHLORIDE 1250 MG: 10 INJECTION, POWDER, LYOPHILIZED, FOR SOLUTION INTRAVENOUS at 01:12

## 2019-05-05 RX ADMIN — NOREPINEPHRINE BITARTRATE 0.02 MCG/KG/MIN: 8 SOLUTION at 00:00

## 2019-05-05 RX ADMIN — SODIUM CHLORIDE, POTASSIUM CHLORIDE, SODIUM LACTATE AND CALCIUM CHLORIDE 100 ML/HR: 600; 310; 30; 20 INJECTION, SOLUTION INTRAVENOUS at 04:30

## 2019-05-05 RX ADMIN — HEPARIN SODIUM 5000 UNITS: 5000 INJECTION INTRAVENOUS; SUBCUTANEOUS at 22:11

## 2019-05-05 RX ADMIN — TAZOBACTAM SODIUM AND PIPERACILLIN SODIUM 4.5 G: 500; 4 INJECTION, SOLUTION INTRAVENOUS at 06:05

## 2019-05-05 RX ADMIN — HEPARIN SODIUM 5000 UNITS: 5000 INJECTION INTRAVENOUS; SUBCUTANEOUS at 06:05

## 2019-05-05 RX ADMIN — SODIUM CHLORIDE, PRESERVATIVE FREE 3 ML: 5 INJECTION INTRAVENOUS at 21:00

## 2019-05-05 RX ADMIN — TAZOBACTAM SODIUM AND PIPERACILLIN SODIUM 3.38 G: 375; 3 INJECTION, SOLUTION INTRAVENOUS at 00:01

## 2019-05-05 RX ADMIN — SODIUM CHLORIDE, POTASSIUM CHLORIDE, SODIUM LACTATE AND CALCIUM CHLORIDE 100 ML/HR: 600; 310; 30; 20 INJECTION, SOLUTION INTRAVENOUS at 22:11

## 2019-05-05 NOTE — H&P
"INTENSIVIST   HOSPITAL VISIT NOTE     Hospital:  LOS: 0 days     Subjective   S     Ms. Cecille Quinn, 49 y.o. female is followed for:    Overdose    Hypotension    As an Intensivist, we provide an integrated approach to the ICU patient and family, medical management of comorbid conditions, including but not limited to electrolytes, glycemic control, organ dysfunction, lead interdisciplinary rounds and coordinate the care with all other services, including those from other specialists.     HPI:  Ms. Quinn is a 48 yo ? who presented to ED last night with decreased mental status.    According to patients girlfriend, when she returned home from work she found the patient slumped over and blue. She gave her a dose of Narcan they had at home and she called 911.     EMS gave an additional dose of narcan with minimal improvement so she was brought to ED for evaluation.     Per family reports patient has a significant history of drug abuse including a drug overdose last year that left her with extremely short term memory loss (reported as < 5 minutes). Her drug of choice at that time was \"anything she could get her hands on\" but Heroine was frequently used. Since that admission patients \"desire\" for use has been decreased and per her family they do not believe she has used because she has not had the ability to do so. She does have a prescription for Klonopin and Eliquis.     On arrival patient UDS was positive for benzodiazapines, cocaine and THC.     Additional significant labs include: UA + blood, glucose, protein and WBC, WBC 25, BUN 11, creat 1.1. She was given 2L fluid bolus and Vancomycin and Piperacillin-Tazobactam .    Due to her hypotension, Dr. Arroyo, called me to admit her to the ICU.     On assessment patient appears lethargic and disoriented. She is hemodynamically improving on Levophed, which was started after my conversation with Dr. Arroyo.     She is now awake. She remember taking pills, but she does " "not know which one were they, she just took whatever pills she found. She has been \"upset, at her situation, but she did not want to hurt her\"?     PMH: She  has a past medical history of Arthritis, Atrial fibrillation (CMS/HCC), Chronic hepatitis C without hepatic coma (CMS/HCC) (10/31/2018), Depression, and H/O blood clots.   PSxH: She  has a past surgical history that includes Muscle biopsy; Other surgical history; and Stomach surgery.      Medications:  No current facility-administered medications on file prior to encounter.      Current Outpatient Medications on File Prior to Encounter   Medication Sig   • acetaminophen (TYLENOL) 500 MG tablet Take 500 mg by mouth Every 6 (Six) Hours As Needed for Mild Pain  (3000mg prn).   • clonazePAM (KlonoPIN) 1 MG tablet Take 1 mg by mouth 2 (Two) Times a Day As Needed for Seizures.   • diclofenac (VOLTAREN) 75 MG EC tablet    • donepezil (ARICEPT) 10 MG tablet Take 1 PO daily   • ELIQUIS 5 MG tablet tablet Take 1 tablet by mouth 2 (Two) Times a Day.   • folic acid (FOLVITE) 400 MCG tablet Take 1 tablet by mouth Daily.   • gabapentin (NEURONTIN) 300 MG capsule Take 1 mg by mouth As Needed.   • Perphenazine-Amitriptyline 4-50 MG tablet        Allergies: She is allergic to cymbalta [duloxetine hcl] and paxil [paroxetine hcl].   FH: Her family history includes Heart attack in her father and mother; Hypertension in her father and mother; Lung cancer in her father; Prostate cancer in her father; Skin cancer in her brother and father; Stroke in her paternal grandfather; Thyroid disease in her mother.   SH: She  reports that she has been smoking.  She uses smokeless tobacco. She reports that she does not drink alcohol or use drugs.     ROS:   ROS cannot be reliably obtained from the patient due to her Altered Mental Status.     Objective   O     .Vitals:  Temp: 97.6 °F (36.4 °C) (05/04/19 2106) Temp  Min: 97.6 °F (36.4 °C)  Max: 97.6 °F (36.4 °C)   BP: 93/61 (05/05/19 0000) BP  " Min: 71/43  Max: 104/62   Pulse: 96 (05/04/19 2354) Pulse  Min: 89  Max: 106   Resp: 16 (05/04/19 2106) Resp  Min: 16  Max: 16   SpO2: 91 % (05/04/19 2354) SpO2  Min: 89 %  Max: 99 %   Device:      Flow Rate:   No Data Recorded     Intake/Ouptut 24 hrs (7:00AM - 6:59 AM)  Intake/Output       05/04/19 0700 - 05/05/19 0659    Intake (ml) 1000    Output (ml) --    Net (ml) 1000    Last Weight  63.5 kg (140 lb)           Medications (drips):    norepinephrine Last Rate: 0.02 mcg/kg/min (05/05/19 0000)   Pharmacy Consult        Physical Examination    Telemetry:  Rhythm: sinus tachycardia (05/04/19 2109)   Constitutional:  No acute distress.   Cardiovascular: Normal rate, regular and rhythm. Normal heart sounds.  No murmurs, gallop or rub.   Respiratory: No respiratory distress.  Normal breath sounds  No adventitious sounds.    Abdominal:  Soft with no tenderness  No distension. No HSM.   Extremities: Warm with no cyanosis.  No peripheral edema.   Neurological:   Awake.  Best Eye Response: 4-->(E4) spontaneous (05/04/19 2109)  Best Motor Response: 6-->(M6) obeys commands (05/04/19 2109)  Best Verbal Response: 4-->(V4) confused (05/04/19 2109)  Julianne Coma Scale Score: 14 (05/04/19 2109)   Lines/Drains/Airways: Peripheral IV(s)     Hematology:  Results from last 7 days   Lab Units 05/04/19 2153   WBC 10*3/mm3 25.66*   HEMOGLOBIN g/dL 12.5   MCV fL 87.2   PLATELETS 10*3/mm3 324     Chemistry:  Estimated Creatinine Clearance: 60.8 mL/min (A) (by C-G formula based on SCr of 1.11 mg/dL (H)).    Results from last 7 days   Lab Units 05/04/19 2152   SODIUM mmol/L 143   POTASSIUM mmol/L 3.8   CHLORIDE mmol/L 103   CO2 mmol/L 21.0*   ANION GAP mmol/L 19.0   GLUCOSE mg/dL 231*   CALCIUM mg/dL 8.8     Results from last 7 days   Lab Units 05/04/19  2152   BUN mg/dL 11   CREATININE mg/dL 1.11*           Hepatic:  Results from last 7 days   Lab Units 05/04/19  2152   ALK PHOS U/L 142*   BILIRUBIN mg/dL 0.2   ALT (SGPT) U/L 26   AST  "(SGOT) U/L 34*   ALBUMIN g/dL 4.00     Lab Results   Lab Value Date/Time    THCURSCR Positive (A) 05/04/2019 2200    PCPUR Negative 05/04/2019 2200    COCAINEUR Positive (A) 05/04/2019 2200    METAMPSCNUR Negative 05/04/2019 2200    LABOPIASCN Negative 05/04/2019 2200    AMPHETSCREEN Negative 05/04/2019 2200    LABBENZSCN Positive (A) 05/04/2019 2200    TRICYCLICSCN Negative 05/04/2019 2200    LABMETHSCN Negative 05/04/2019 2200    BARBITSCNUR Negative 05/04/2019 2200    OXYCODONESCN Negative 05/04/2019 2200      Lab Results   Lab Value Date/Time    PROCALCITO 0.04 (L) 05/04/2019 2152       Lab Results   Lab Value Date/Time    LACTATE 2.3 (C) 05/05/2019 0016      Images:  Xr Chest 1 View    Result Date: 5/4/2019  No acute cardiopulmonary findings. Signer Name: Lang Ramesy MD  Signed: 5/4/2019 9:55 PM  Workstation Name: [email or web address]     Echo:       Results: Reviewed.  I reviewed the patient's new laboratory and imaging results.  I independently reviewed the patient's new images.    Medications: Reviewed.    Assessment/Plan   A / P     Assessment:    49 y.o.female, admitted on 5/4/2019 with No admission diagnoses are documented for this encounter.:     1. Altered mental status  1. Probable BZD OD  2. Polysubstance use; on admission UDS:  1. THC  2. Cocaine  3. BZD  3. 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  2. Hypotension / Shock  1. R/O Medication related.  2. R/O Hypovolemia  3. Hepatitis C  4. Previous \"Blood Clots\"  5. A Fibrillation:  1. On APIxaban   6. Leukocytosis on admission.  1. Antibiotics: started in ED.  7. Depression.  8. Glucose: No hIstory of DM        No results found for: HGBA1C    Diet: Diet Regular   Advance Directives: Code Status and Medical Interventions:   Ordered at: 05/05/19 0010     Code Status:    CPR     Medical Interventions (Level of Support Prior to Arrest):    Full        Plan:    1. ICU Admission  2. IVF   3. Hemodynamic support - Wean NE gtt.  4. ECHO  5. Cortisol " level  6. Empiric Antibiotics  7. Follow up PCT.  8. Hold APIxaban for now.  9. Hold Gabapentin  10. Avoid nephrotoxic drugs, like NSAIDs.  11. Consult . May need Ridge evaluation      Plan of care and goals reviewed during interdisciplinary rounds.  I discussed the patient's findings and my recommendations with nursing staff    Level of Risk is High due to:  illness with threat to life or bodily function.     Time: was greater than 33 minutes.    (This excludes time spent performing separately reportable procedures and services).  Patient was at high risk of imminent or life-threatening deterioration in her condition due to Hypotension.     Aiden Contreras MD, FACP, FCCP, CNSC  Intensive Care Medicine, Nutrition Support and Pulmonary Medicine
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
## 2019-05-05 NOTE — PROGRESS NOTES
"Nutrition Services    Patient Name:  Cecille Quinn  YOB: 1970  MRN: [id number]  Admit Date:  5/4/2019    RD clarified MST of \"unsure\" unintentional weight loss. Pt reports no recent wt loss, states that she has gained ~20 lb over the past 1 year. Reports that she has a good appetite. Pt does not meet nutrition risk screen criteria at this time. Will follow per protocol.     Date Weight (kg) Weight (lbs) Weight Method   5/5/2019 73.4 kg 161 lb 13.1 oz -   5/4/2019 63.504 kg 140 lb -   3/11/2019 70.761 kg 156 lb -   1/14/2019 67.4 kg 148 lb 9.4 oz -   12/3/2018 64.5 kg 142 lb 3.2 oz -   11/5/2018 64.5 kg 142 lb 3.2 oz -   9/28/2018 65.59 kg 144 lb 9.6 oz -   8/27/2018 63.957 kg 141 lb -   8/16/2018 63.685 kg 140 lb 6.4 oz -   8/13/2018 63.05 kg 139 lb -   6/28/2018 61.689 kg 136 lb -   6/26/2018 61.689 kg 136 lb -   5/30/2018 58.06 kg 128 lb -   5/16/2018 57.153 kg 126 lb -   5/11/2018 53.978 kg 119 lb Standing scale       Electronically signed by:  Naa Posey MS RD/[facility]  05/05/19 10:24 AM   "

## 2019-05-05 NOTE — PLAN OF CARE
Problem: Patient Care Overview  Goal: Plan of Care Review  Outcome: Ongoing (interventions implemented as appropriate)  Pt admit from ED.  Drowsy.  Awakens to voice.  Levophed titrated to keep MAP>65.  Will continue to monitor.  Goal: Discharge Needs Assessment  Outcome: Ongoing (interventions implemented as appropriate)      Problem: Skin Injury Risk (Adult)  Goal: Identify Related Risk Factors and Signs and Symptoms  Outcome: Ongoing (interventions implemented as appropriate)    Goal: Skin Health and Integrity  Outcome: Ongoing (interventions implemented as appropriate)

## 2019-05-05 NOTE — PAYOR COMM NOTE
"Cecille Quinn (49 y.o. Female) Initial notification and clinicals, please review    Date of Birth Social Security Number Address Home Phone MRN    1970  [address] [id number] [phone] [id number]    [employer] Mar[employer] Status          Confucianism        Admission Date Admission Type Admitting Provider Attending Provider Department, Room/Bed    5/4/19 Emergency Aiden Contreras MD Villaran, Yuri, MD [facility] 2B ICU, N232/1    Discharge Date Discharge Disposition Discharge Destination                       Attending Provider:  Aiden Contreras MD    Allergies:  Cymbalta [Duloxetine Hcl], Paxil [Paroxetine Hcl]    Isolation:  None   Infection:  None   Code Status:  CPR    Ht:  170.2 cm (67\")   Wt:  63.5 kg (140 lb)    Admission Cmt:  None   Principal Problem:  Drug overdose, accidental or unintentional, initial encounter [T50.901A]                 Active Insurance as of 5/4/2019     Primary Coverage     Payor Plan Insurance Group Employer/Plan Group    ANTH MEDICAID [facility] MEDICAID [id number]     Payor Plan Address Payor Plan Phone Number Payor Plan Fax Number Effective Dates    [address] [address] [phone]  4/1/2018 - None Entered    [facility] [id number]       Subscriber Name Subscriber Birth Date Member ID       CECILLE QUINN 1970 [id number]                 Emergency Contacts      (Rel.) Home Phone Work Phone Mobile Phone    Melanie Gallardo (Significant Other) [phone] -- --    Marty Gallardoa (Daughter) -- -- [phone]    Pearl Williamson (Relative) [phone] -- --            Treatment Team     Provider Relationship Specialty Contact    Aiden Contreras MD Attending -- [phone]    Yaya William Patient Care Technician --     Andria Muñoz, RN Registered Nurse -- [phone]          Problem List           Codes Noted - Resolved       Hospital    Shock (CMS/HCC) ICD-10-CM: R57.9  ICD-9-CM: 785.50 5/5/2019 - Present    " Atrial fibrillation (CMS/HCC) ICD-10-CM: I48.91  ICD-9-CM: 427.31 5/5/2019 - Present    * (Principal) Drug overdose, accidental or unintentional, initial encounter ICD-10-CM: T50.901A  ICD-9-CM: 977.9, E858.9 5/5/2019 - Present    Polysubstance abuse (CMS/HCC) ICD-10-CM: F19.10  ICD-9-CM: 305.90 5/23/2018 - Present       Non-Hospital    H/O blood clots ICD-10-CM: Z86.718  ICD-9-CM: V12.51 5/5/2019 - Present    Brain injury (CMS/HCC) ICD-10-CM: S06.9X9A  ICD-9-CM: 854.00 5/5/2019 - Present    Chronic hepatitis C without hepatic coma (CMS/HCC) ICD-10-CM: B18.2  ICD-9-CM: 070.54 10/31/2018 - Present          History & Physical     No notes of this type exist for this encounter.        Intake & Output (last day)       05/04 0701 - 05/05 0700    IV Piggyback 1000    Total Intake(mL/kg) 1000 (15.7)    Net +1000             Prior to Admission Medications     Prescriptions Last Dose Informant Patient Reported? Taking?    acetaminophen (TYLENOL) 500 MG tablet   Yes No    Take 500 mg by mouth Every 6 (Six) Hours As Needed for Mild Pain  (3000mg prn).    clonazePAM (KlonoPIN) 1 MG tablet   Yes No    Take 1 mg by mouth 2 (Two) Times a Day As Needed for Seizures.    diclofenac (VOLTAREN) 75 MG EC tablet   Yes No    donepezil (ARICEPT) 10 MG tablet   No No    Take 1 PO daily    ELIQUIS 5 MG tablet tablet   No No    Take 1 tablet by mouth 2 (Two) Times a Day.    folic acid (FOLVITE) 400 MCG tablet   No No    Take 1 tablet by mouth Daily.    gabapentin (NEURONTIN) 300 MG capsule   Yes No    Take 1 mg by mouth As Needed.    Perphenazine-Amitriptyline 4-50 MG tablet   Yes No            Lab Results (last 24 hours)     Procedure Component Value Units Date/Time    Procalcitonin [[id number]]  (Abnormal) Collected:  05/04/19 2152    Specimen:  Blood from Arm, Left Updated:  05/05/19 0144     Procalcitonin 0.04 ng/mL     Narrative:       As a Marker for Sepsis (Non-Neonates):   1. <0.5 ng/mL represents a low risk of severe sepsis and/or  "septic shock.  1. >2 ng/mL represents a high risk of severe sepsis and/or septic shock.    As a Marker for Lower Respiratory Tract Infections that require antibiotic therapy:  PCT on Admission     Antibiotic Therapy             6-12 Hrs later  > 0.5                Strongly Recommended            >0.25 - <0.5         Recommended  0.1 - 0.25           Discouraged                   Remeasure/reassess PCT  <0.1                 Strongly Discouraged          Remeasure/reassess PCT      As 28 day mortality risk marker: \"Change in Procalcitonin Result\" (> 80 % or <=80 %) if Day 0 (or Day 1) and Day 4 values are available. Refer to http://www.[employer] [facility] [email or web address]pct-calculator.com/   Change in PCT <=80 %   A decrease of PCT levels below or equal to 80 % defines a positive change in PCT test result representing a higher risk for 28-day all-cause mortality of patients diagnosed with severe sepsis or septic shock.  Change in PCT > 80 %   A decrease of PCT levels of more than 80 % defines a negative change in PCT result representing a lower risk for 28-day all-cause mortality of patients diagnosed with severe sepsis or septic shock.                Lactic Acid, Plasma [[id number]]  (Abnormal) Collected:  05/05/19 0016    Specimen:  Blood Updated:  05/05/19 0132     Lactate 2.3 mmol/L      Comment: Falsely depressed results may occur on samples drawn from patients receiving N-Acetylcysteine (NAC) or Metamizole.       Lactic Acid, Reflex Timer (This will reflex a repeat order 3-3:15 hours after ordered.) [[id number]] Collected:  05/05/19 0016    Specimen:  Blood Updated:  05/05/19 0132    CK [[id number]] Collected:  05/04/19 2152    Specimen:  Blood from Arm, Left Updated:  05/05/19 0121    Blood Culture - Blood, Wrist, Left [[id number]] Collected:  05/05/19 0012    Specimen:  Blood from Wrist, Left Updated:  05/05/19 0033    Blood Culture - Blood, Hand, Left [[id number]] Collected:  05/05/19 0018    Specimen:  Blood from Hand, Left Updated:  " 05/05/19 0032    Urinalysis With Culture If Indicated - Urine, Catheter In/Out [[id number]]  (Abnormal) Collected:  05/04/19 2200    Specimen:  Urine, Catheter In/Out Updated:  05/04/19 2240     Color, UA Yellow     Appearance, UA Clear     pH, UA 5.5     Specific Gravity, UA 1.015     Glucose, UA >=1000 mg/dL (3+)     Ketones, UA Negative     Bilirubin, UA Negative     Blood, UA Moderate (2+)     Protein,  mg/dL (2+)     Leuk Esterase, UA Negative     Nitrite, UA Negative     Urobilinogen, UA 0.2 E.U./dL    Urinalysis, Microscopic Only - Urine, Catheter In/Out [[id number]]  (Abnormal) Collected:  05/04/19 2200    Specimen:  Urine, Catheter In/Out Updated:  05/04/19 2240     RBC, UA 7-12 /HPF      WBC, UA 6-12 /HPF      Bacteria, UA None Seen /HPF      Squamous Epithelial Cells, UA 7-12 /HPF      Hyaline Casts, UA None Seen /LPF      Methodology Manual Light Microscopy    Urine Culture - Urine, Urine, Catheter In/Out [[id number]] Collected:  05/04/19 2200    Specimen:  Urine, Catheter In/Out Updated:  05/04/19 2240    CBC & Differential [[id number]] Collected:  05/04/19 2153    Specimen:  Blood Updated:  05/04/19 2240    Narrative:       The following orders were created for panel order CBC & Differential.  Procedure                               Abnormality         Status                     ---------                               -----------         ------                     Scan Slide[[id number]]                                                                  CBC Auto Differential[[id number]]        Abnormal            Final result                 Please view results for these tests on the individual orders.    Comprehensive Metabolic Panel [[id number]]  (Abnormal) Collected:  05/04/19 2152    Specimen:  Blood from Arm, Left Updated:  05/04/19 2238     Glucose 231 mg/dL      BUN 11 mg/dL      Creatinine 1.11 mg/dL      Sodium 143 mmol/L      Potassium 3.8 mmol/L      Chloride 103 mmol/L      CO2 21.0 mmol/L       Calcium 8.8 mg/dL      Total Protein 7.6 g/dL      Albumin 4.00 g/dL      ALT (SGPT) 26 U/L      AST (SGOT) 34 U/L      Comment: Specimen hemolyzed.  Results may be affected.        Alkaline Phosphatase 142 U/L      Total Bilirubin 0.2 mg/dL      eGFR Non African Amer 52 mL/min/1.73      Globulin 3.6 gm/dL      A/G Ratio 1.1 g/dL      BUN/Creatinine Ratio 9.9     Anion Gap 19.0 mmol/L     Narrative:       GFR Normal >60  Chronic Kidney Disease <60  Kidney Failure <15    Troponin [[id number]]  (Normal) Collected:  05/04/19 2152    Specimen:  Blood from Arm, Left Updated:  05/04/19 2228     Troponin T <0.010 ng/mL     Narrative:       Troponin T Reference Range:  <= 0.03 ng/mL-   Negative for AMI  >0.03 ng/mL-     Abnormal for myocardial necrosis.  Clinicians would have to utilize clinical acumen, EKG, Troponin and serial changes to determine if it is an Acute Myocardial Infarction or myocardial injury due to an underlying chronic condition.     Urine Drug Screen - Urine, Catheter [[id number]]  (Abnormal) Collected:  05/04/19 2200    Specimen:  Urine, Catheter Updated:  05/04/19 2227     THC, Screen, Urine Positive     Phencyclidine (PCP), Urine Negative     Cocaine Screen, Urine Positive     Methamphetamine, Ur Negative     Opiate Screen Negative     Amphetamine Screen, Urine Negative     Benzodiazepine Screen, Urine Positive     Tricyclic Antidepressants Screen Negative     Methadone Screen, Urine Negative     Barbiturates Screen, Urine Negative     Oxycodone Screen, Urine Negative     Propoxyphene Screen Negative     Buprenorphine, Screen, Urine Negative    Narrative:       Cutoff For Drugs Screened:    Amphetamines               500 ng/ml  Barbiturates               200 ng/ml  Benzodiazepines            150 ng/ml  Cocaine                    150 ng/ml  Methadone                  200 ng/ml  Opiates                    100 ng/ml  Phencyclidine               25 ng/ml  THC                            50  ng/ml  Methamphetamine            500 ng/ml  Tricyclic Antidepressants  300 ng/ml  Oxycodone                  100 ng/ml  Propoxyphene               300 ng/ml  Buprenorphine               10 ng/ml    The normal value for all drugs tested is negative. This report includes unconfirmed screening results, with the cutoff values listed, to be used for medical treatment purposes only.  Unconfirmed results must not be used for non-medical purposes such as employment or legal testing.  Clinical consideration should be applied to any drug of abuse test, particularly when unconfirmed results are used.      CBC Auto Differential [[id number]]  (Abnormal) Collected:  05/04/19 2153    Specimen:  Blood from Arm, Left Updated:  05/04/19 2221     WBC 25.66 10*3/mm3      RBC 4.54 10*6/mm3      Hemoglobin 12.5 g/dL      Hematocrit 39.6 %      MCV 87.2 fL      MCH 27.5 pg      MCHC 31.6 g/dL      RDW 15.1 %      RDW-SD 48.5 fl      MPV 10.8 fL      Platelets 324 10*3/mm3      Neutrophil % 93.3 %      Lymphocyte % 4.0 %      Monocyte % 2.0 %      Eosinophil % 0.1 %      Basophil % 0.1 %      Immature Grans % 0.5 %      Neutrophils, Absolute 23.96 10*3/mm3      Lymphocytes, Absolute 1.02 10*3/mm3      Monocytes, Absolute 0.52 10*3/mm3      Eosinophils, Absolute 0.02 10*3/mm3      Basophils, Absolute 0.02 10*3/mm3      Immature Grans, Absolute 0.12 10*3/mm3         Imaging Results (last 24 hours)     Procedure Component Value Units Date/Time    XR Chest 1 View [[id number]] Collected:  05/04/19 2155     Updated:  05/04/19 2157    Narrative:       CR Chest 1 Vw    INDICATION:   Mid chest pain for 3 to 4 hours     COMPARISON:    None available.    FINDINGS:  Single portable AP view of the chest.  The heart and mediastinal contours are normal. The lungs are clear. No pneumothorax or pleural effusion.      Impression:       No acute cardiopulmonary findings.    Signer Name: Lang Ramsey MD   Signed: 5/4/2019 9:55 PM   Workstation Name: [id number]            ECG/EMG Results (last 24 hours)     Procedure Component Value Units Date/Time    ECG 12 Lead [[id number]] Collected:  05/04/19 2129     Updated:  05/04/19 2135    Narrative:       Test Reason : altered mental status  Blood Pressure : **/** mmHG  Vent. Rate : 103 BPM     Atrial Rate : 103 BPM     P-R Int : 192 ms          QRS Dur : 078 ms      QT Int : 356 ms       P-R-T Axes : 052 028 -24 degrees     QTc Int : 466 ms    Sinus tachycardia  Low voltage QRS  Cannot rule out Anterior infarct , age undetermined  ST & T wave abnormality, consider inferolateral ischemia  Abnormal ECG  No previous ECGs available  Confirmed by ERI GRANDE ([id number]) on 5/4/2019 9:34:58 PM    Referred By:  [email or web address]           Confirmed By:ERI GRANDE          Orders (last 24 hrs)     Start     Ordered    05/05/19 0600  piperacillin-tazobactam (ZOSYN) 4.5 g in iso-osmotic dextrose 100 mL IVPB (premix)  Every 8 Hours      05/05/19 0203    05/05/19 0600  CBC & Differential  Morning Draw      05/05/19 0206    05/05/19 0600  Basic Metabolic Panel  Morning Draw      05/05/19 0206    05/05/19 0600  Magnesium  Morning Draw      05/05/19 0206    05/05/19 0600  Phosphorus  Morning Draw      05/05/19 0206    05/05/19 0600  Procalcitonin  Morning Draw      05/05/19 0206    05/05/19 0600  Lactic Acid, Plasma  Morning Draw      05/05/19 0206    05/05/19 0600  CBC Auto Differential  PROCEDURE ONCE      05/05/19 0206    05/05/19 0205  acetaminophen (TYLENOL) tablet 650 mg  Every 4 Hours PRN      05/05/19 0206    05/05/19 0205  ondansetron (ZOFRAN) injection 4 mg  Every 6 Hours PRN      05/05/19 0206    05/05/19 0201  Adult Transthoracic Echo Complete W/ Cont if Necessary Per Protocol  Once      05/05/19 0200    05/05/19 0143  Inpatient Admission  Once      05/05/19 0142    05/05/19 0133  Lactic Acid, Reflex Timer (This will reflex a repeat order 3-3:15 hours after ordered.)  Once      05/05/19 0132    05/05/19 0100  Vital Signs Every Hour and Per  Hospital Policy Based on Patient Condition  Every Hour      05/05/19 0010    05/05/19 0100  Intake and Output  Every Hour      05/05/19 0010    05/05/19 0036  Critical Care  Once     Comments:  This order was created via procedure documentation    05/05/19 0035    05/05/19 0015  pharmacy to dose vancomycin  Continuous PRN,   Status:  Discontinued      05/05/19 0015    05/05/19 0012  sodium chloride 0.9 % flush 3 mL  Every 12 Hours Scheduled      05/05/19 0010    05/05/19 0012  heparin (porcine) 5000 UNIT/ML injection 5,000 Units  Every 8 Hours Scheduled      05/05/19 0010    05/05/19 0011  Daily Weights  Daily      05/05/19 0010    05/05/19 0011  Diet Regular  Diet Effective Now      05/05/19 0010    05/05/19 0009  Code Status and Medical Interventions:  Continuous      05/05/19 0010    05/05/19 0009  Cardiac Monitoring  Continuous      05/05/19 0010    05/05/19 0009  Continuous Pulse Oximetry  Continuous      05/05/19 0010    05/05/19 0009  Strict Bed Rest  Until Discontinued      05/05/19 0010    05/05/19 0009  Use Mobility Guidelines for Advancement of Activity  Continuous      05/05/19 0010    05/05/19 0009  Height & Weight  Once      05/05/19 0010    05/05/19 0009  Insert Peripheral IV  Once      05/05/19 0010    05/05/19 0009  Saline Lock & Maintain IV Access  Continuous      05/05/19 0010    05/05/19 0008  sodium chloride 0.9 % flush 3-10 mL  As Needed      05/05/19 0010    05/04/19 2350  sodium chloride 0.9 % bolus 1,000 mL  Once      05/04/19 2348    05/04/19 2350  ICU / CCU Bed Request  Once      05/04/19 2349    05/04/19 2349  norepinephrine (LEVOPHED) 8 mg/250 mL (32 mcg/mL) in sodium chloride 0.9% infusion (premix)  Titrated      05/04/19 2347    05/04/19 2333  sodium chloride 0.9 % bolus 1,000 mL  Once      05/04/19 2331    05/04/19 2333  piperacillin-tazobactam (ZOSYN) 3.375 g in iso-osmotic dextrose 50 ml (premix)  Once      05/04/19 2331    05/04/19 2333  vancomycin 1250 mg/250 mL 0.9% NS IVPB ([facility])   Once      05/04/19 2331    05/04/19 2333  CK  STAT      05/04/19 2332    05/04/19 2332  Blood Culture - Blood,  Once      05/04/19 2331    05/04/19 2332  Blood Culture - Blood,  Once      05/04/19 2331    05/04/19 2332  Lactic Acid, Plasma  Once      05/04/19 2331    05/04/19 2332  Procalcitonin  Once      05/04/19 2331    05/04/19 2241  Urine Culture - Urine,  Once      05/04/19 2240    05/04/19 2222  Scan Slide  Once,   Status:  Canceled      05/04/19 2221    05/04/19 2221  Urinalysis, Microscopic Only - Urine, Clean Catch  Once      05/04/19 2220 05/04/19 2119  sodium chloride 0.9 % bolus 1,000 mL  Once      05/04/19 2117    05/04/19 2118  Troponin  Once      05/04/19 2117    05/04/19 2117  CBC & Differential  Once      05/04/19 2117    05/04/19 2117  Comprehensive Metabolic Panel  Once      05/04/19 2117    05/04/19 2117  Insert peripheral IV  Once      05/04/19 2117    05/04/19 2117  Urine Drug Screen - Urine, Clean Catch  STAT      05/04/19 2117    05/04/19 2117  Urinalysis With Culture If Indicated - Urine, Catheter  Once      05/04/19 2117    05/04/19 2117  ECG 12 Lead  Once      05/04/19 2117    05/04/19 2117  XR Chest 1 View  1 Time Imaging      05/04/19 2117    05/04/19 2117  CBC Auto Differential  PROCEDURE ONCE      05/04/19 2117    05/04/19 2116  sodium chloride 0.9 % flush 10 mL  As Needed      05/04/19 2117          Ventilator/Non-Invasive Ventilation Settings (From admission, onward)    None        Physician Progress Notes (last 24 hours) (Notes from 5/4/2019  2:12 AM through 5/5/2019  2:12 AM)     No notes of this type exist for this encounter.

## 2019-05-05 NOTE — PLAN OF CARE
Problem: Patient Care Overview  Goal: Plan of Care Review  Outcome: Ongoing (interventions implemented as appropriate)   05/05/19 9618   Plan of Care Review   Progress improving   Coping/Psychosocial   Plan of Care Reviewed With patient   OTHER   Outcome Summary Weaned levophed off, maintaining SBP > 85 and MAP > 65 off levophed. HR 50s, Patient on 1 L NC. Patient is drowsy, oriented to self and situation but does not generally know where she is. Per her partner she has baseline short term memory loss. Mg 1.7 replaced. Patient also complained of chest pain this afternoon, 4/10, no cardiac changes on the monitor. 12 lead obtained and notified APRN, troponin ordered. Patient's significant other has called and expressed concern that this overdose was a potential suicide attempt, the patient denies this. Will discuss in group rounds tomorrow.       Problem: Skin Injury Risk (Adult)  Intervention: Prevent/Manage Excess Moisture   05/05/19 1300 05/05/19 1400 05/05/19 1600   Hygiene Care   Perineal Care --  perineum cleansed --    Bathing/Skin Care bath, complete;dressed/undressed;linen changed --  --    Skin Interventions   Skin Protection --  --  incontinence pads utilized     Intervention: Maintain Head of Bed Elevation Less Than 30 Degrees as Tolerated   05/05/19 1600   Positioning   Head of Bed (HOB) HOB at 45 degrees     Intervention: Prevent/Minimize Shear/Friction Injuries   05/05/19 1600   Skin Interventions   Pressure Reduction Devices pressure-redistributing mattress utilized   Positioning   Positioning/Transfer Devices pillows;in use     Intervention: Prevent or Minimize Pressure   05/05/19 1600   Skin Interventions   Pressure Reduction Techniques frequent weight shift encouraged;weight shift assistance provided   Positioning   Body Position other (see comments)  (self turning)       Goal: Identify Related Risk Factors and Signs and Symptoms  Outcome: Ongoing (interventions implemented as appropriate)    05/05/19 0516   Skin Injury Risk (Adult)   Related Risk Factors (Skin Injury Risk) cognitive impairment     Goal: Skin Health and Integrity  Outcome: Ongoing (interventions implemented as appropriate)   05/05/19 1710   Skin Injury Risk (Adult)   Skin Health and Integrity making progress toward outcome

## 2019-05-06 LAB
ALBUMIN SERPL-MCNC: 3.1 G/DL (ref 3.5–5.2)
ANION GAP SERPL CALCULATED.3IONS-SCNC: 10 MMOL/L
BACTERIA SPEC AEROBE CULT: NO GROWTH
BASOPHILS # BLD AUTO: 0.02 10*3/MM3 (ref 0–0.2)
BASOPHILS NFR BLD AUTO: 0.3 % (ref 0–1.5)
BUN BLD-MCNC: 8 MG/DL (ref 6–20)
BUN/CREAT SERPL: 12.3 (ref 7–25)
CALCIUM SPEC-SCNC: 8.1 MG/DL (ref 8.6–10.5)
CHLORIDE SERPL-SCNC: 106 MMOL/L (ref 98–107)
CO2 SERPL-SCNC: 24 MMOL/L (ref 22–29)
CREAT BLD-MCNC: 0.65 MG/DL (ref 0.57–1)
DEPRECATED RDW RBC AUTO: 49.1 FL (ref 37–54)
EOSINOPHIL # BLD AUTO: 0.12 10*3/MM3 (ref 0–0.4)
EOSINOPHIL NFR BLD AUTO: 1.5 % (ref 0.3–6.2)
ERYTHROCYTE [DISTWIDTH] IN BLOOD BY AUTOMATED COUNT: 15.3 % (ref 12.3–15.4)
GFR SERPL CREATININE-BSD FRML MDRD: 96 ML/MIN/1.73
GLUCOSE BLD-MCNC: 88 MG/DL (ref 65–99)
GLUCOSE BLDC GLUCOMTR-MCNC: 77 MG/DL (ref 70–130)
GLUCOSE BLDC GLUCOMTR-MCNC: 79 MG/DL (ref 70–130)
GLUCOSE BLDC GLUCOMTR-MCNC: 92 MG/DL (ref 70–130)
GLUCOSE BLDC GLUCOMTR-MCNC: 97 MG/DL (ref 70–130)
HCT VFR BLD AUTO: 30.3 % (ref 34–46.6)
HGB BLD-MCNC: 9.3 G/DL (ref 12–15.9)
IMM GRANULOCYTES # BLD AUTO: 0.01 10*3/MM3 (ref 0–0.05)
IMM GRANULOCYTES NFR BLD AUTO: 0.1 % (ref 0–0.5)
LYMPHOCYTES # BLD AUTO: 2.61 10*3/MM3 (ref 0.7–3.1)
LYMPHOCYTES NFR BLD AUTO: 33.6 % (ref 19.6–45.3)
MAGNESIUM SERPL-MCNC: 2 MG/DL (ref 1.6–2.6)
MCH RBC QN AUTO: 26.5 PG (ref 26.6–33)
MCHC RBC AUTO-ENTMCNC: 30.7 G/DL (ref 31.5–35.7)
MCV RBC AUTO: 86.3 FL (ref 79–97)
MONOCYTES # BLD AUTO: 0.6 10*3/MM3 (ref 0.1–0.9)
MONOCYTES NFR BLD AUTO: 7.7 % (ref 5–12)
NEUTROPHILS # BLD AUTO: 4.4 10*3/MM3 (ref 1.7–7)
NEUTROPHILS NFR BLD AUTO: 56.8 % (ref 42.7–76)
PHOSPHATE SERPL-MCNC: 2.4 MG/DL (ref 2.5–4.5)
PLATELET # BLD AUTO: 287 10*3/MM3 (ref 140–450)
PMV BLD AUTO: 10.5 FL (ref 6–12)
POTASSIUM BLD-SCNC: 3.7 MMOL/L (ref 3.5–5.2)
RBC # BLD AUTO: 3.51 10*6/MM3 (ref 3.77–5.28)
SODIUM BLD-SCNC: 140 MMOL/L (ref 136–145)
WBC NRBC COR # BLD: 7.76 10*3/MM3 (ref 3.4–10.8)

## 2019-05-06 PROCEDURE — 83735 ASSAY OF MAGNESIUM: CPT | Performed by: INTERNAL MEDICINE

## 2019-05-06 PROCEDURE — 99232 SBSQ HOSP IP/OBS MODERATE 35: CPT | Performed by: INTERNAL MEDICINE

## 2019-05-06 PROCEDURE — 82962 GLUCOSE BLOOD TEST: CPT

## 2019-05-06 PROCEDURE — 80069 RENAL FUNCTION PANEL: CPT | Performed by: INTERNAL MEDICINE

## 2019-05-06 PROCEDURE — 85025 COMPLETE CBC W/AUTO DIFF WBC: CPT | Performed by: INTERNAL MEDICINE

## 2019-05-06 PROCEDURE — 25010000002 HEPARIN (PORCINE) PER 1000 UNITS: Performed by: NURSE PRACTITIONER

## 2019-05-06 RX ORDER — LANOLIN ALCOHOL/MO/W.PET/CERES
400 CREAM (GRAM) TOPICAL DAILY
Status: DISCONTINUED | OUTPATIENT
Start: 2019-05-06 | End: 2019-05-07 | Stop reason: HOSPADM

## 2019-05-06 RX ORDER — ARIPIPRAZOLE 10 MG/1
10 TABLET ORAL NIGHTLY
COMMUNITY

## 2019-05-06 RX ORDER — DONEPEZIL HYDROCHLORIDE 10 MG/1
10 TABLET, FILM COATED ORAL NIGHTLY
Status: DISCONTINUED | OUTPATIENT
Start: 2019-05-06 | End: 2019-05-07 | Stop reason: HOSPADM

## 2019-05-06 RX ORDER — OMEPRAZOLE 20 MG/1
20 CAPSULE, DELAYED RELEASE ORAL DAILY
COMMUNITY

## 2019-05-06 RX ADMIN — SODIUM CHLORIDE, POTASSIUM CHLORIDE, SODIUM LACTATE AND CALCIUM CHLORIDE 100 ML/HR: 600; 310; 30; 20 INJECTION, SOLUTION INTRAVENOUS at 08:20

## 2019-05-06 RX ADMIN — HEPARIN SODIUM 5000 UNITS: 5000 INJECTION INTRAVENOUS; SUBCUTANEOUS at 08:14

## 2019-05-06 RX ADMIN — Medication 400 MCG: at 12:46

## 2019-05-06 RX ADMIN — SODIUM CHLORIDE, PRESERVATIVE FREE 3 ML: 5 INJECTION INTRAVENOUS at 20:59

## 2019-05-06 RX ADMIN — DONEPEZIL HYDROCHLORIDE 10 MG: 10 TABLET, FILM COATED ORAL at 20:59

## 2019-05-06 RX ADMIN — APIXABAN 5 MG: 5 TABLET, FILM COATED ORAL at 12:46

## 2019-05-06 RX ADMIN — APIXABAN 5 MG: 5 TABLET, FILM COATED ORAL at 20:59

## 2019-05-06 NOTE — PROGRESS NOTES
INTENSIVIST   HOSPITAL VISIT NOTE     Hospital:  LOS: 1 day     Subjective   S     Ms. Cecille Quinn, 49 y.o. female is followed for:    Overdose    Hypotension    As an Intensivist, we provide an integrated approach to the ICU patient and family, medical management of comorbid conditions, including but not limited to electrolytes, glycemic control, organ dysfunction, lead interdisciplinary rounds and coordinate the care with all other services, including those from other specialists.     HPI:  Doing well.  Eating and drinking well.    C/o Back Pain.    Off pressors.       ROS:   Constitutional: Negative for fever or chills.   Respiratory: Negative for dyspnea or cough.   Cardiovascular: Negative for chest pain or palpitations.   Gastrointestinal: Negative for  nausea, vomiting or diarrhea.      Objective   O     .Vitals:  Temp: 98.5 °F (36.9 °C) (05/06/19 0400) Temp  Min: 97.9 °F (36.6 °C)  Max: 98.5 °F (36.9 °C)   BP: 110/72 (05/06/19 0600) BP  Min: 73/47  Max: 123/82   Pulse: 61 (05/06/19 0600) Pulse  Min: 55  Max: 89   Resp: 20 (05/06/19 0600) Resp  Min: 18  Max: 24   SpO2: 91 % (05/06/19 0600) SpO2  Min: 89 %  Max: 100 %   Device: nasal cannula (05/06/19 0600)    Flow Rate: 2 (05/06/19 0600) Flow (L/min)  Min: 1  Max: 2     Intake/Ouptut 24 hrs (7:00AM - 6:59 AM)  Intake/Output       05/05/19 0700 - 05/06/19 0659    Intake (ml) 3251.6    Output (ml) 660    Net (ml) 2591.6         Physical Examination    Telemetry:  Rhythm: normal sinus rhythm, sinus bradycardia (05/06/19 0400)   Constitutional:  No acute distress.   Cardiovascular: Normal rate, regular and rhythm. Normal heart sounds.  No murmurs, gallop or rub.   Respiratory: No respiratory distress.  Normal breath sounds  No adventitious sounds.    Abdominal:  Soft with no tenderness  No distension. No HSM.   Extremities: Warm with no cyanosis.  No peripheral edema.   Neurological:   Awake.  Best Eye Response: 4-->(E4) spontaneous (05/05/19 2000)  Best  Motor Response: 6-->(M6) obeys commands (05/05/19 2000)  Best Verbal Response: 4-->(V4) confused (05/05/19 2000)  [place] Coma Scale Score: 14 (05/05/19 2000)   Lines/Drains/Airways: Peripheral IV(s)     Hematology:  Results from last 7 days   Lab Units 05/06/19 0409 05/05/19 0431 05/04/19  2153   WBC 10*3/mm3 7.76 15.54* 25.66*   HEMOGLOBIN g/dL 9.3* 11.0* 12.5   MCV fL 86.3 85.9 87.2   PLATELETS 10*3/mm3 287 317 324     Chemistry:  Estimated Creatinine Clearance: 120 mL/min (by C-G formula based on SCr of 0.65 mg/dL).    Results from last 7 days   Lab Units 05/06/19 0409 05/05/19 0431 05/04/19  2152   SODIUM mmol/L 140 144 143   POTASSIUM mmol/L 3.7 4.0 3.8   CHLORIDE mmol/L 106 108* 103   CO2 mmol/L 24.0 17.0* 21.0*   ANION GAP mmol/L 10.0 19.0 19.0   GLUCOSE mg/dL 88 83 231*   CALCIUM mg/dL 8.1* 8.0* 8.8     Results from last 7 days   Lab Units 05/06/19 0409 05/05/19 0431 05/04/19  2152   BUN mg/dL 8 10 11   CREATININE mg/dL 0.65 0.78 1.11*     Results from last 7 days   Lab Units 05/06/19 0409 05/05/19  0431   MAGNESIUM mg/dL 2.0 1.7   PHOSPHORUS mg/dL 2.4* 3.3     Hepatic:  Results from last 7 days   Lab Units 05/06/19 0409 05/04/19  2152   ALK PHOS U/L  --  142*   BILIRUBIN mg/dL  --  0.2   ALT (SGPT) U/L  --  26   AST (SGOT) U/L  --  34*   ALBUMIN g/dL 3.10* 4.00      Images:  Xr Chest 1 View    Result Date: 5/4/2019  No acute cardiopulmonary findings. Signer Name: Lang Ramsey MD  Signed: 5/4/2019 9:55 PM  Workstation Name: [id number]     Echo:  Results for orders placed during the hospital encounter of 05/04/19   Adult Transthoracic Echo Complete W/ Cont if Necessary Per Protocol    Narrative · Left ventricular systolic function is normal.  · Estimated EF appears to be in the range of 56 - 60%.          Results: Reviewed.  I reviewed the patient's new laboratory and imaging results.  I independently reviewed the patient's new images.    Medications: Reviewed.    Assessment/Plan   A / P  "    Assessment:    49 y.o.female, admitted on 5/4/2019 with Drug overdose, accidental or unintentional, initial encounter [T50.160X]:     1. Altered mental status } Resolved.  1. Probable BZD OD  2. Polysubstance use; on admission UDS:  1. THC  2. Cocaine  3. BZD  3. 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  2. Hypotension / Shock } Resolved.  1. R/O Medication related.  2. R/O Hypovolemia  3. Hepatitis C  4. Previous \"Blood Clots\"  5. A Fibrillation:  1. On APIxaban   6. Depression.  7. Leukocytosis: resolved. Off abs.  8. Glucose: No hIstory of DM    Results from last 7 days   Lab Units 05/06/19  0522 05/05/19  2359 05/05/19  1714 05/05/19  1227 05/05/19  1111 05/05/19  0735   GLUCOSE mg/dL 77 79 96 100 98 89     No results found for: HGBA1C    Diet: Diet Regular   Advance Directives: Code Status and Medical Interventions:   Ordered at: 05/05/19 0010     Code Status:    CPR     Medical Interventions (Level of Support Prior to Arrest):    Full        Plan:    1. Resume Donepezil.  2. Discontinue IVF.  3. Resume APIxaban   4. Consult [facility] evaluation  5. Disposition: [facility] vs [facility].       Plan of care and goals reviewed during interdisciplinary rounds.  I discussed the patient's findings and my recommendations with nursing staff    Level of Risk is Moderate due to: prescription drug management.     Aiden Contreras MD, FACP, FCCP, CNSC  Intensive Care Medicine, Nutrition Support and Pulmonary Medicine     "

## 2019-05-06 NOTE — PLAN OF CARE
Problem: Patient Care Overview  Goal: Plan of Care Review  Outcome: Ongoing (interventions implemented as appropriate)   05/06/19 2837   Plan of Care Review   Progress improving   Coping/Psychosocial   Plan of Care Reviewed With patient;significant other   OTHER   Outcome Summary Pt's VSS throughout the shift. She has complained of some mild back pain throughout the shift, but states that it helped to reposition in the bed. She has had a flat affect and is very forgetful, but this is baseline. A  came by and talked to her today, but she said that the overdose was on accident and she declined in patient or outpatient rehabs. The information was still left at the bedside. I spoke with her signiificant other about her beiing discharged and she said that she would like to talk to the Dr and  tomorrow. She is concerned about her coming home because she will not be supervised 24/7. She also said that she wants the patient to recieve some kind of help with her addiction, and if she does not agree, then she is not welcome home. Will continue to monitor and follow.

## 2019-05-06 NOTE — PLAN OF CARE
Problem: Patient Care Overview  Goal: Plan of Care Review  Outcome: Ongoing (interventions implemented as appropriate)  VSS.   Ambulating with standby.  Mental status has returned to baseline.  Voiding per bathroom.  Tolerating diet.  Complaints of slight chest soreness with movement.  Will continue to monitor.  Goal: Discharge Needs Assessment  Outcome: Ongoing (interventions implemented as appropriate)

## 2019-05-06 NOTE — PROGRESS NOTES
Discharge Planning Assessment  [facility]     Patient Name: Cecille Quinn  MRN: [id number]  Today's Date: 5/6/2019    Admit Date: 5/4/2019    Discharge Needs Assessment     Row Name 05/06/19 1108       Living Environment    Lives With  significant other    Current Living Arrangements  home/apartment/condo    Primary Care Provided by  self    Provides Primary Care For  no one    Family Caregiver if Needed  significant other    Quality of Family Relationships  involved    Able to Return to Prior Arrangements  yes    Living Arrangement Comments  Lives in [place]       Transition Planning    Patient/Family Anticipates Transition to  home with family    Patient/Family Anticipated Services at Transition      Transportation Anticipated  family or friend will provide       Discharge Needs Assessment    Concerns to be Addressed  discharge planning    Equipment Currently Used at Home  none    Anticipated Changes Related to Illness  none    Equipment Needed After Discharge  none        Discharge Plan     Row Name 05/06/19 1117       Plan    Plan  Spoke with Cecille Quinn for an assessment. She lives in [place] with her significant other. She came in the hospital due to a drug overdose. She states that the overdose was accidental.  She said she has been to rehab in the past. She is not agreeable to go to rehab again. She denies being suicidal. She said she is not depressed but she said that she has intense pain in her back.  She has [facility] Medicaid insurance. Social work provided a listing of treatment options. Case managment will follow Cecille Quinn.    Patient/Family in Agreement with Plan  yes    Final Discharge Disposition Code  01 - home or self-care        Destination      No service coordination in this encounter.      Durable Medical Equipment      No service coordination in this encounter.      Dialysis/Infusion      No service coordination in this encounter.      Home Medical Care      No service  coordination in this encounter.      Therapy      No service coordination in this encounter.      Community Resources      No service coordination in this encounter.          Demographic Summary     Row Name 05/06/19 1107       General Information    Admission Type  inpatient    Arrived From  home    Reason for Consult  discharge planning    Preferred Language  English     Used During This Interaction  yes       Contact Information    Permission Granted to Share Info With      Contact Information Obtained for          Functional Status     Row Name 05/06/19 1108       Functional Status    Usual Activity Tolerance  good    Current Activity Tolerance  good       Functional Status, IADL    Medications  independent    Meal Preparation  independent    Housekeeping  independent    Laundry  independent    Shopping  independent        Psychosocial    No documentation.       Abuse/Neglect    No documentation.       Legal    No documentation.       Substance Abuse     Row Name 05/06/19 1113       Substance Use    Substance Use Status  current street drug/inhalant/medication abuse        Patient Forms    No documentation.           FIORELLA Mac

## 2019-05-06 NOTE — PROGRESS NOTES
Clinical Nutrition Note      Patient Name: Cecille Quinn  MRN: [id number]  Admission date: 5/4/2019      Multidisciplinary Rounds    Additional information obtained during MDR: RN reports pt better this am. S.O. has concerns re: POA and health care surrogate.  MD needs to have pt assessed for inpt rehab; OD attempted or accidental. Plan to to transfer to floor or adm to the [facility] after  evaluation.    Current diet: Diet Regular    Pertinent medical data reviewed    Intervention:  Plan of care and goals reviewed    Monitor:  RD to follow per protocol      Courtney Steele MS,RD,LD  05/06/19 4:23 PM  Time: 20 min
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
## 2019-05-07 VITALS
DIASTOLIC BLOOD PRESSURE: 84 MMHG | HEIGHT: 67 IN | BODY MASS INDEX: 25.4 KG/M2 | SYSTOLIC BLOOD PRESSURE: 133 MMHG | WEIGHT: 161.82 LBS | OXYGEN SATURATION: 96 % | RESPIRATION RATE: 16 BRPM | HEART RATE: 76 BPM | TEMPERATURE: 97.7 F

## 2019-05-07 PROCEDURE — 99239 HOSP IP/OBS DSCHRG MGMT >30: CPT | Performed by: NURSE PRACTITIONER

## 2019-05-07 RX ORDER — ARIPIPRAZOLE 10 MG/1
10 TABLET ORAL NIGHTLY
Status: DISCONTINUED | OUTPATIENT
Start: 2019-05-07 | End: 2019-05-07 | Stop reason: HOSPADM

## 2019-05-07 RX ADMIN — Medication 400 MCG: at 08:25

## 2019-05-07 RX ADMIN — ACETAMINOPHEN 650 MG: 325 TABLET, FILM COATED ORAL at 07:34

## 2019-05-07 RX ADMIN — APIXABAN 5 MG: 5 TABLET, FILM COATED ORAL at 08:24

## 2019-05-07 NOTE — PROGRESS NOTES
Case Management Discharge Note    Final Note: Pt going home with her friend today. Pt will go to stay with her sister in [facility] tomorrow. CM gave pt information for outpt drug rehab in [facility].    Destination      No service has been selected for the patient.      Durable Medical Equipment      No service has been selected for the patient.      Dialysis/Infusion      No service has been selected for the patient.      Home Medical Care      No service has been selected for the patient.      Therapy      No service has been selected for the patient.      Community Resources      No service has been selected for the patient.             Final Discharge Disposition Code: 01 - home or self-care
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
## 2019-05-07 NOTE — PLAN OF CARE
Problem: Patient Care Overview  Goal: Plan of Care Review   05/07/19 0635   Plan of Care Review   Progress improving   Coping/Psychosocial   Plan of Care Reviewed With patient   OTHER   Outcome Summary VSS throughout shift. No acute changes overnight, will continue to monitor.

## 2019-05-07 NOTE — DISCHARGE SUMMARY
"Discharge Summary    Patient name: Cecille Quinn  CSN: [id number]  MRN: [id number]  : 1970  Today's date: 2019     Date of Admission: 2019    Date of Discharge:  2019    Admitting Physician: VIVIAN Contreras MD    Primary Care Provider: Payton Benavides PA-C    Consultations:         Admission Diagnosis:   Altered mental status  Shock  Hepatitis C  History of blood clots  A. Fib  Leukocytosis  Depression    Discharge Diagnoses:     Drug overdose (BZD)    Shock (CMS/HCC)    Polysubstance abuse (CMS/HCC)    Chronic hepatitis C without hepatic coma (CMS/HCC)    Atrial fibrillation (CMS/HCC)      Procedures:  None       History of Present Illness:  Ms. Quinn is a 50 yo ? who presented to ED last night with decreased mental status.     According to patients girlfriend, when she returned home from work she found the patient slumped over and blue. She gave her a dose of Narcan they had at home and she called 911.      EMS gave an additional dose of narcan with minimal improvement so she was brought to ED for evaluation.      Per family reports patient has a significant history of drug abuse including a drug overdose last year that left her with extremely short term memory loss (reported as < 5 minutes). Her drug of choice at that time was \"anything she could get her hands on\" but Heroine was frequently used. Since that admission patients \"desire\" for use has been decreased and per her family they do not believe she has used because she has not had the ability to do so. She does have a prescription for Klonopin and Eliquis.      On arrival patient UDS was positive for benzodiazapines, cocaine and THC.      Additional significant labs include: UA + blood, glucose, protein and WBC, WBC 25, BUN 11, creat 1.1. She was given 2L fluid bolus and Vancomycin and Piperacillin-Tazobactam .     Due to her hypotension, Dr. Arroyo, called me to admit her to the ICU.      On assessment patient appears " "lethargic and disoriented. She is hemodynamically improving on Levophed, which was started after my conversation with Dr. Arroyo.      She is now awake. She remember taking pills, but she does not know which one were they, she just took whatever pills she found. She has been \"upset, at her situation, but she did not want to hurt her\"?    Hospital Course:  Cecille Quinn is a 49 y.o. female who presented to [facility] as discussed in HPI.  She was admitted to ICU and started on hemodynamic support with norepinephrine.  Ryan antimicrobials were given and an echo and cortisol level were obtained.  By hospital day #1 her condition improved considerably and she was able to eat and drink.  She was weaned off of vasoactive drips and her home medications including Donepezil and Apixaban were restarted.   was asked to see the patient for possible inpatient psychiatric evaluation.  She was found not to be a candidate for inpatient rehabilitation and information was given by  for outpatient drug rehabilitation in [facility].  Patient will be discharged and is to move in with a niece and supervised 24/7 by family in [facility].    Vitals:  /84 (BP Location: Right arm, Patient Position: Lying)   Pulse 76   Temp 97.7 °F (36.5 °C) (Axillary)   Resp 16   Ht 170.2 cm (67\")   Wt 73.4 kg (161 lb 13.1 oz)   SpO2 96%   BMI 25.34 kg/m²     Advance Directives: Code Status and Medical Interventions:   Ordered at: 05/05/19 0010     Code Status:    CPR     Medical Interventions (Level of Support Prior to Arrest):    Full        Physical Exam:  Telemetry:  Rhythm: normal sinus rhythm, sinus bradycardia (05/07/19 1000)   Constitutional:  No acute distress.   Cardiovascular: Normal rate, regular and rhythm. Normal heart sounds.  No murmurs, gallop or rub.   Respiratory: No respiratory distress.  Normal breath sounds  No adventitious sounds.    Abdominal:  Soft with no tenderness  No " distension. No HSM.   Extremities: Warm with no cyanosis.  No peripheral edema.   Neurological:             Awake.  Best Eye Response: 4-->(E4) spontaneous (05/07/19 1000)  Best Motor Response: 6-->(M6) obeys commands (05/07/19 1000)  Best Verbal Response: 4-->(V4) confused (05/07/19 1000)  [place] Coma Scale Score: 14 (05/07/19 1000)   Lines/Drains/Airways: Peripheral IV(s)     Labs:  Results from last 7 days   Lab Units 05/06/19  0409   WBC 10*3/mm3 7.76   HEMOGLOBIN g/dL 9.3*   HEMATOCRIT % 30.3*   PLATELETS 10*3/mm3 287     Results from last 7 days   Lab Units 05/06/19  0409  05/04/19  2152   SODIUM mmol/L 140   < > 143   POTASSIUM mmol/L 3.7   < > 3.8   CHLORIDE mmol/L 106   < > 103   CO2 mmol/L 24.0   < > 21.0*   BUN mg/dL 8   < > 11   CREATININE mg/dL 0.65   < > 1.11*   CALCIUM mg/dL 8.1*   < > 8.8   BILIRUBIN mg/dL  --   --  0.2   ALK PHOS U/L  --   --  142*   ALT (SGPT) U/L  --   --  26   AST (SGOT) U/L  --   --  34*   GLUCOSE mg/dL 88   < > 231*    < > = values in this interval not displayed.         Magnesium   Date Value Ref Range Status   05/06/2019 2.0 1.6 - 2.6 mg/dL Final   05/05/2019 1.7 1.6 - 2.6 mg/dL Final     Phosphorus   Date Value Ref Range Status   05/06/2019 2.4 (L) 2.5 - 4.5 mg/dL Final   05/05/2019 3.3 2.5 - 4.5 mg/dL Final                 Discharge Medications:     Discharge Medications      Continue These Medications      Instructions Start Date   ARIPiprazole 10 MG tablet  Commonly known as:  ABILIFY   10 mg, Oral, Nightly      clonazePAM 1 MG tablet  Commonly known as:  KlonoPIN   1 mg, Oral, 2 Times Daily PRN      donepezil 10 MG tablet  Commonly known as:  ARICEPT   Take 1 PO daily      folic acid 400 MCG tablet  Commonly known as:  FOLVITE   400 mcg, Oral, Daily      gabapentin 300 MG capsule  Commonly known as:  NEURONTIN   300 mg, Oral, 2 Times Daily PRN      omeprazole 20 MG capsule  Commonly known as:  priLOSEC   20 mg, Oral, Daily             Discharge Diet:   Diet Instructions      Regular Diet                   Activity at Discharge:   Activity Instructions     As Tolerated                  Follow-up Appointments  No future appointments.      Discharge Instructions:  Home with assistance  Seek drug rehabilitation       JOHN Gordon, ACNP-BC  Pulmonary & Critical Care Medicine    Time: Discharge 35 min    CC: Payton Benavides, TARUN

## 2019-05-07 NOTE — NURSING NOTE
The patient's significant other, Melanie, said that she is not willing to allow the patient to continue to live with her if she does not seek some kind of substance abuse treatment. , Dr Mendez, and myself, met with the two fot them and the plan for discharge is for the patient to leave today with her significant other. From there, the patient will be moving into her niece, soheila's house in [facility], and she will seek out patient treatment there. She will be supervised 24/7 by family in [facility]. We provided information for treatment facilities in [facility]. We also advised the patient to follow up with her primary car physician also.

## 2019-05-08 ENCOUNTER — TELEPHONE (OUTPATIENT)
Dept: INTERNAL MEDICINE | Facility: CLINIC | Age: 49
End: 2019-05-08

## 2019-05-08 ENCOUNTER — READMISSION MANAGEMENT (OUTPATIENT)
Dept: CALL CENTER | Facility: HOSPITAL | Age: 49
End: 2019-05-08

## 2019-05-08 NOTE — TELEPHONE ENCOUNTER
[facility]  Called pt to complete post-discharge call and coordinate PCP hospital fu. LVM and requested return call.

## 2019-05-08 NOTE — OUTREACH NOTE
Prep Survey      Responses   Facility patient discharged from?  [facility]   Is patient eligible?  No   What are the reasons patient is not eligible?  Other [Drug abuse]   Does the patient have one of the following disease processes/diagnoses(primary or secondary)?  Other   Prep survey completed?  Yes          Gardenia Escobar RN

## 2019-05-10 LAB
BACTERIA SPEC AEROBE CULT: NORMAL
BACTERIA SPEC AEROBE CULT: NORMAL

## 2019-05-10 NOTE — TELEPHONE ENCOUNTER
The pt states she is doing ok.  She denies any fever, chills, chest pain, tachycardia, palps, dizziness, lightheadedness, n/v, bowel issues. She admits to mild chest soreness which she states is not new and was present during hospitalization as well as mild SOA which she states has not worsened. She reports eating and drinking well with good appetite.  No difficulty urinating.  Pt states she is staying with her sister in [facility] so she declined PCP appt at this time.  Encouraged pt to seek care at local [facility] for any persistent or worsening sx's and she voiced understanding. Denies any questions, concerns, or needs at time of call and voiced appreciation for the f/u.

## 2019-05-30 ENCOUNTER — DOCUMENTATION (OUTPATIENT)
Dept: PHYSICAL THERAPY | Facility: HOSPITAL | Age: 49
End: 2019-05-30

## 2019-05-30 DIAGNOSIS — M25.511 RIGHT SHOULDER PAIN, UNSPECIFIED CHRONICITY: Primary | ICD-10-CM

## 2019-05-30 NOTE — THERAPY DISCHARGE NOTE
Outpatient Physical Therapy Discharge Summary         Patient Name: Cecille Quinn  : 1970  MRN: [id number]    Today's Date: 2019    Visit Dx:    ICD-10-CM ICD-9-CM   1. Right shoulder pain, unspecified chronicity M25.511 719.41       PT OP Goals     Row Name 19 1400          PT Short Term Goals    STG Date to Achieve  19  -JEANINE     STG 1  Patient to be compliant with initial HEP  -JEANINE     STG 1 Progress  Met  -JEANINE     STG 2  Patient to demonstrate reduced scapular hiking during shoulder flexion  -JEANINE     STG 2 Progress  Met  -JEANINE        Long Term Goals    LTG Date to Achieve  19  -JEANINE     LTG 1  Patient to be independent with final HEP  -JEANINE     LTG 1 Progress  Met  -JEANINE     LTG 2  Patient to improve Quick Dash score to at least 30%  -JEANINE     LTG 2 Progress  Met  -JEANINE     LTG 3  Patient to demonstrate shoulder flexion at least 150  -JEANINE     LTG 3 Progress  Met  -JEANINE     LTG 4  Patient to acheive HBB to at least L4  -JEANINE     LTG 4 Progress  Met  -JEANINE       User Key  (r) = Recorded By, (t) = Taken By, (c) = Cosigned By    Initials Name Provider Type    Lan Gonsalez, PT Physical Therapist          OP PT Discharge Summary  Date of Discharge: 19  Reason for Discharge: All goals achieved, Independent  Outcomes Achieved: Able to achieve all goals within established timeline  Discharge Destination: Home without follow-up  Discharge Instructions/Additional Comments: All goals met, d/c to [facility]      Time Calculation:                    Lan William, PT  2019

## 2019-09-19 DIAGNOSIS — R41.3 MEMORY LOSS: ICD-10-CM

## 2019-09-19 RX ORDER — DONEPEZIL HYDROCHLORIDE 10 MG/1
TABLET, FILM COATED ORAL
Qty: 30 TABLET | Refills: 4 | OUTPATIENT
Start: 2019-09-19

## 2019-09-25 ENCOUNTER — OFFICE VISIT (OUTPATIENT)
Dept: NEUROLOGY | Facility: CLINIC | Age: 49
End: 2019-09-25

## 2019-09-25 VITALS
BODY MASS INDEX: 23.39 KG/M2 | DIASTOLIC BLOOD PRESSURE: 68 MMHG | HEART RATE: 65 BPM | OXYGEN SATURATION: 98 % | HEIGHT: 67 IN | SYSTOLIC BLOOD PRESSURE: 118 MMHG | WEIGHT: 149 LBS

## 2019-09-25 DIAGNOSIS — R41.3 MEMORY LOSS: Primary | ICD-10-CM

## 2019-09-25 PROCEDURE — 99213 OFFICE O/P EST LOW 20 MIN: CPT | Performed by: NURSE PRACTITIONER

## 2019-09-25 RX ORDER — DONEPEZIL HYDROCHLORIDE 5 MG/1
5 TABLET, FILM COATED ORAL NIGHTLY
Qty: 30 TABLET | Refills: 2 | Status: SHIPPED | OUTPATIENT
Start: 2019-09-25 | End: 2020-09-24
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
## 2020-04-21 ENCOUNTER — TELEMEDICINE (OUTPATIENT)
Dept: FAMILY MEDICINE CLINIC | Facility: TELEHEALTH | Age: 50
End: 2020-04-21

## 2020-04-21 DIAGNOSIS — R10.9 ABDOMINAL PAIN, UNSPECIFIED ABDOMINAL LOCATION: Primary | ICD-10-CM

## 2020-04-21 PROCEDURE — 99213 OFFICE O/P EST LOW 20 MIN: CPT | Performed by: NURSE PRACTITIONER

## 2020-04-21 RX ORDER — FLUOXETINE HYDROCHLORIDE 20 MG/1
CAPSULE ORAL
COMMUNITY
Start: 2020-03-23

## 2020-04-21 RX ORDER — PERPHENAZINE AND AMITRIPTYLINE HYDROCHLORIDE 4; 50 MG/1; MG/1
TABLET, FILM COATED ORAL
COMMUNITY
Start: 2020-03-23

## 2020-04-21 NOTE — PROGRESS NOTES
Subjective     Cecille Quinn is a 50 y.o. female who presents to the clinic with:      Abdominal Pain   This is a new problem. Episode onset: about 5 days ago. The onset quality is sudden. The problem occurs constantly. The problem has been unchanged. Pain location: Unable to determine. Pain scale: 5-7 at worst. The quality of the pain is cramping. The abdominal pain radiates to the back. Associated symptoms include anorexia (only able to eat few bites) and belching. Pertinent negatives include no constipation, diarrhea, dysuria, fever, nausea or vomiting. Associated symptoms comments: Hot/cold feeling. Nothing aggravates the pain. The pain is relieved by nothing. She has tried proton pump inhibitors (Pepto-bismol, Omeprazole) for the symptoms. The treatment provided no relief.          The following portions of the patient's history were reviewed and updated as appropriate: allergies, current medications, past family history, past medical history, past social history, past surgical history and problem list.      Review of Systems   Constitutional: Positive for appetite change (decreased). Negative for fever. Chills: alternating hot/cold feeling.   Gastrointestinal: Positive for abdominal pain and anorexia (only able to eat few bites). Negative for constipation, diarrhea, nausea and vomiting.   Genitourinary: Negative for dysuria.   All other systems reviewed and are negative.        Objective   Physical Exam   Constitutional: She is oriented to person, place, and time. She appears well-developed and well-nourished.   Pulmonary/Chest: Effort normal. No respiratory distress.   Abdominal:   Patient directed exam with no abdominal tenderness   Neurological: She is alert and oriented to person, place, and time.   Skin: Skin is warm and dry.   Psychiatric: She has a normal mood and affect. Her behavior is normal.         Assessment/Plan   Cecille was seen today for abdominal pain.    Diagnoses and all orders for this  visit:    Abdominal pain, unspecified abdominal location      Patient Instructions   Due to the limitations with the video visit, I have recommended Ms. Quinn to a higher level of care for further evaluation and treatment. I have recommended she go to the Urgent Care Center and she verbalizes understanding of that.

## 2020-04-21 NOTE — PATIENT INSTRUCTIONS
Due to the limitations with the video visit, I have recommended Ms. Quinn to a higher level of care for further evaluation and treatment. I have recommended she go to the Urgent Care Center and she verbalizes understanding of that.

## 2022-12-18 NOTE — TELEPHONE ENCOUNTER
CERTIFICATE OF WORK     12/18/2022      Re: Kimberley Cain        [address]  [facility] [address]      This is to certify that Kimberley Cain has been under my care from12/18/2022 and can work from home 12/21/2022 through 12/23/22. Return to in person work on 12/27/2022.            SIGNATURE:___________________________________________,   12/18/2022  [facility]        [facility] [address]  [facility] [address] [address]  [phone]   ----- Message from Akanksha Brewer sent at 10/31/2018  2:55 PM EDT -----  [phone]    PT HAD MRI ON ARM A FEW DAYS AGO.  HAS NOT GOTTEN RESULTS YET AND HER ARM IS STILL REALLY HURTING.  CAN YOU PRESCRIBE SOMETHING FOR THAT?    BONITA SMITH/NEW [place]
